# Patient Record
Sex: FEMALE | Race: WHITE | Employment: OTHER | URBAN - NONMETROPOLITAN AREA
[De-identification: names, ages, dates, MRNs, and addresses within clinical notes are randomized per-mention and may not be internally consistent; named-entity substitution may affect disease eponyms.]

---

## 2017-09-06 ENCOUNTER — OFFICE VISIT (OUTPATIENT)
Dept: SURGERY | Age: 67
End: 2017-09-06

## 2017-09-06 VITALS
BODY MASS INDEX: 39.27 KG/M2 | WEIGHT: 230 LBS | HEIGHT: 64 IN | DIASTOLIC BLOOD PRESSURE: 77 MMHG | SYSTOLIC BLOOD PRESSURE: 131 MMHG | HEART RATE: 77 BPM

## 2017-09-06 DIAGNOSIS — Z12.11 SCREENING FOR COLON CANCER: Primary | ICD-10-CM

## 2017-09-06 PROCEDURE — 99203 OFFICE O/P NEW LOW 30 MIN: CPT | Performed by: SURGERY

## 2017-09-06 RX ORDER — MULTIVITAMIN
TABLET ORAL
COMMUNITY

## 2017-09-06 ASSESSMENT — ENCOUNTER SYMPTOMS
EYE ITCHING: 0
CONSTIPATION: 0
EYE REDNESS: 0
STRIDOR: 0
PHOTOPHOBIA: 0
ANAL BLEEDING: 0
CHOKING: 0
APNEA: 0
COLOR CHANGE: 0
SORE THROAT: 0
BACK PAIN: 0
RECTAL PAIN: 0

## 2017-09-08 ENCOUNTER — TELEPHONE (OUTPATIENT)
Dept: SURGERY | Age: 67
End: 2017-09-08

## 2017-09-11 ENCOUNTER — TELEPHONE (OUTPATIENT)
Dept: SURGERY | Age: 67
End: 2017-09-11

## 2017-09-26 PROCEDURE — 45380 COLONOSCOPY AND BIOPSY: CPT | Performed by: SURGERY

## 2017-10-17 ENCOUNTER — TELEPHONE (OUTPATIENT)
Dept: SURGERY | Age: 67
End: 2017-10-17

## 2017-10-17 NOTE — TELEPHONE ENCOUNTER
Spoke to [place] regarding her resched. suzan @ [facility]. Notified of dates, times and location.     Phone assessment  cscope- 11/8/17 @ 800  P/o - 11/22/17 @ 145    Clear liquids and suprep start on 11/7/17 -4pm/4am  Not on blood thinners   sent

## 2017-11-08 ENCOUNTER — HOSPITAL ENCOUNTER (OUTPATIENT)
Dept: SURGERY | Age: 67
Discharge: HOME | End: 2017-11-02
Attending: SURGERY | Admitting: SURGERY

## 2017-11-08 VITALS
HEIGHT: 63 IN | TEMPERATURE: 97 F | WEIGHT: 225 LBS | DIASTOLIC BLOOD PRESSURE: 152 MMHG | OXYGEN SATURATION: 97 % | HEART RATE: 78 BPM | BODY MASS INDEX: 39.87 KG/M2 | SYSTOLIC BLOOD PRESSURE: 172 MMHG | RESPIRATION RATE: 14 BRPM

## 2017-11-08 RX ORDER — SODIUM CHLORIDE, SODIUM LACTATE, POTASSIUM CHLORIDE, CALCIUM CHLORIDE 600; 310; 30; 20 MG/100ML; MG/100ML; MG/100ML; MG/100ML
INJECTION, SOLUTION INTRAVENOUS CONTINUOUS
Status: DISPENSED | OUTPATIENT
Start: 2017-11-08

## 2017-11-08 RX ADMIN — SODIUM CHLORIDE, SODIUM LACTATE, POTASSIUM CHLORIDE, CALCIUM CHLORIDE: 600; 310; 30; 20 INJECTION, SOLUTION INTRAVENOUS at 07:52

## 2017-11-08 ASSESSMENT — PAIN SCALES - GENERAL: PAINLEVEL_OUTOF10: 0

## 2017-11-22 ENCOUNTER — OFFICE VISIT (OUTPATIENT)
Dept: SURGERY | Age: 67
End: 2017-11-22

## 2017-11-22 VITALS
BODY MASS INDEX: 39.88 KG/M2 | SYSTOLIC BLOOD PRESSURE: 106 MMHG | HEIGHT: 63 IN | HEART RATE: 80 BPM | DIASTOLIC BLOOD PRESSURE: 71 MMHG | WEIGHT: 225.09 LBS

## 2017-11-22 DIAGNOSIS — Z12.11 SCREENING FOR COLON CANCER: Primary | ICD-10-CM

## 2017-11-22 DIAGNOSIS — D12.5 ADENOMATOUS POLYP OF SIGMOID COLON: ICD-10-CM

## 2017-11-22 PROCEDURE — 99213 OFFICE O/P EST LOW 20 MIN: CPT | Performed by: SURGERY

## 2017-11-22 ASSESSMENT — ENCOUNTER SYMPTOMS
COLOR CHANGE: 0
RECTAL PAIN: 0
EYE ITCHING: 0
ANAL BLEEDING: 0
CONSTIPATION: 0
APNEA: 0
SORE THROAT: 0
BACK PAIN: 0
CHOKING: 0
EYE REDNESS: 0
PHOTOPHOBIA: 0
STRIDOR: 0

## 2017-12-01 ENCOUNTER — HOSPITAL ENCOUNTER (OUTPATIENT)
Dept: WOMENS IMAGING | Age: 67
Discharge: HOME | End: 2017-12-01
Attending: OBSTETRICS & GYNECOLOGY | Admitting: OBSTETRICS & GYNECOLOGY

## 2017-12-01 DIAGNOSIS — Z12.31 VISIT FOR SCREENING MAMMOGRAM: ICD-10-CM

## 2018-05-24 ENCOUNTER — POST-OP TELEPHONE (OUTPATIENT)
Dept: VASCULAR SURGERY | Age: 68
End: 2018-05-24

## 2018-12-05 ENCOUNTER — HOSPITAL ENCOUNTER (OUTPATIENT)
Dept: WOMENS IMAGING | Age: 68
Discharge: HOME | End: 2018-12-05
Payer: MEDICARE

## 2018-12-05 DIAGNOSIS — Z12.31 SCREENING MAMMOGRAM, ENCOUNTER FOR: ICD-10-CM

## 2018-12-05 PROCEDURE — 77067 SCR MAMMO BI INCL CAD: CPT

## 2019-01-16 ENCOUNTER — HOSPITAL ENCOUNTER (OUTPATIENT)
Dept: PHYSICAL THERAPY | Age: 69
Setting detail: THERAPIES SERIES
Discharge: HOME | End: 2019-01-16
Payer: MEDICARE

## 2019-01-16 PROCEDURE — 97162 PT EVAL MOD COMPLEX 30 MIN: CPT

## 2019-01-16 PROCEDURE — 97110 THERAPEUTIC EXERCISES: CPT

## 2019-01-16 PROCEDURE — 97016 VASOPNEUMATIC DEVICE THERAPY: CPT

## 2019-01-16 ASSESSMENT — PAIN SCALES - GENERAL: PAINLEVEL_OUTOF10: 0

## 2019-01-16 ASSESSMENT — PAIN DESCRIPTION - ONSET: ONSET: PROGRESSIVE

## 2019-01-16 ASSESSMENT — PAIN DESCRIPTION - DIRECTION: RADIATING_TOWARDS: DENIES

## 2019-01-16 ASSESSMENT — PAIN DESCRIPTION - PAIN TYPE: TYPE: CHRONIC PAIN

## 2019-01-16 ASSESSMENT — PAIN - FUNCTIONAL ASSESSMENT: PAIN_FUNCTIONAL_ASSESSMENT: PREVENTS OR INTERFERES SOME ACTIVE ACTIVITIES AND ADLS

## 2019-01-16 ASSESSMENT — PAIN DESCRIPTION - ORIENTATION: ORIENTATION: LEFT;RIGHT;INNER

## 2019-01-16 ASSESSMENT — PAIN DESCRIPTION - FREQUENCY: FREQUENCY: INTERMITTENT

## 2019-01-16 ASSESSMENT — PAIN DESCRIPTION - DESCRIPTORS: DESCRIPTORS: ACHING;BURNING

## 2019-01-16 ASSESSMENT — PAIN DESCRIPTION - LOCATION: LOCATION: KNEE

## 2019-01-16 ASSESSMENT — PAIN DESCRIPTION - PROGRESSION: CLINICAL_PROGRESSION: GRADUALLY WORSENING

## 2019-01-21 ENCOUNTER — HOSPITAL ENCOUNTER (OUTPATIENT)
Dept: PHYSICAL THERAPY | Age: 69
Discharge: HOME | End: 2019-01-21

## 2019-01-21 NOTE — FLOWSHEET NOTE
Physical Therapy  Cancellation/No-show Note  Patient Name:  Alicia Knox  :  1950   Date:  2019  Cancelled visits to date: 1  No-shows to date: 0    For today's appointment patient:  [x]  Cancelled  []  Rescheduled appointment  []  No-show     Reason given by patient:  []  Patient ill  []  Conflicting appointment  []  No transportation    []  Conflict with work  []  No reason given  [x]  Other:     Comments:  Weather    Electronically signed by:  Fernando Franks PTA

## 2019-01-24 ENCOUNTER — HOSPITAL ENCOUNTER (OUTPATIENT)
Dept: PHYSICAL THERAPY | Age: 69
Setting detail: THERAPIES SERIES
Discharge: HOME | End: 2019-01-24
Payer: MEDICARE

## 2019-01-24 PROCEDURE — 97530 THERAPEUTIC ACTIVITIES: CPT

## 2019-01-24 PROCEDURE — 97016 VASOPNEUMATIC DEVICE THERAPY: CPT

## 2019-01-24 PROCEDURE — 97110 THERAPEUTIC EXERCISES: CPT

## 2019-01-30 ENCOUNTER — HOSPITAL ENCOUNTER (OUTPATIENT)
Dept: PHYSICAL THERAPY | Age: 69
Discharge: HOME | End: 2019-01-30

## 2019-02-01 ENCOUNTER — HOSPITAL ENCOUNTER (OUTPATIENT)
Dept: PHYSICAL THERAPY | Age: 69
Setting detail: THERAPIES SERIES
Discharge: HOME | End: 2019-02-01
Payer: MEDICARE

## 2019-02-01 PROCEDURE — 97530 THERAPEUTIC ACTIVITIES: CPT

## 2019-02-01 PROCEDURE — 97110 THERAPEUTIC EXERCISES: CPT

## 2019-02-01 PROCEDURE — 97016 VASOPNEUMATIC DEVICE THERAPY: CPT

## 2019-02-05 ENCOUNTER — HOSPITAL ENCOUNTER (OUTPATIENT)
Age: 69
Setting detail: OBSERVATION
Discharge: HOME | End: 2019-02-06
Attending: EMERGENCY MEDICINE | Admitting: FAMILY MEDICINE
Payer: MEDICARE

## 2019-02-05 DIAGNOSIS — I20.0 UNSTABLE ANGINA (HCC): Primary | ICD-10-CM

## 2019-02-05 PROBLEM — J44.9 COPD, MILD (HCC): Chronic | Status: ACTIVE | Noted: 2019-02-05

## 2019-02-05 LAB
ANION GAP SERPL CALCULATED.3IONS-SCNC: 8 MMOL/L (ref 4–16)
BASOPHILS ABSOLUTE: 0 K/CU MM
BASOPHILS RELATIVE PERCENT: 0.3 % (ref 0–1)
BUN BLDV-MCNC: 10 MG/DL (ref 6–23)
CALCIUM SERPL-MCNC: 9.6 MG/DL (ref 8.3–10.6)
CHLORIDE BLD-SCNC: 101 MMOL/L (ref 99–110)
CO2: 33 MMOL/L (ref 21–32)
CREAT SERPL-MCNC: 0.7 MG/DL (ref 0.6–1.1)
DIFFERENTIAL TYPE: ABNORMAL
EKG ATRIAL RATE: 92 BPM
EKG DIAGNOSIS: NORMAL
EKG P AXIS: 61 DEGREES
EKG P-R INTERVAL: 144 MS
EKG Q-T INTERVAL: 358 MS
EKG QRS DURATION: 86 MS
EKG QTC CALCULATION (BAZETT): 442 MS
EKG R AXIS: 23 DEGREES
EKG T AXIS: 39 DEGREES
EKG VENTRICULAR RATE: 92 BPM
EOSINOPHILS ABSOLUTE: 0.1 K/CU MM
EOSINOPHILS RELATIVE PERCENT: 1 % (ref 0–3)
GFR AFRICAN AMERICAN: >60 ML/MIN/1.73M2
GFR NON-AFRICAN AMERICAN: >60 ML/MIN/1.73M2
GLUCOSE BLD-MCNC: 104 MG/DL (ref 70–99)
HCT VFR BLD CALC: 46.4 % (ref 37–47)
HEMOGLOBIN: 15 GM/DL (ref 12.5–16)
IMMATURE NEUTROPHIL %: 0.3 % (ref 0–0.43)
LYMPHOCYTES ABSOLUTE: 1 K/CU MM
LYMPHOCYTES RELATIVE PERCENT: 9.7 % (ref 24–44)
MCH RBC QN AUTO: 28.8 PG (ref 27–31)
MCHC RBC AUTO-ENTMCNC: 32.3 % (ref 32–36)
MCV RBC AUTO: 89.2 FL (ref 78–100)
MONOCYTES ABSOLUTE: 0.7 K/CU MM
MONOCYTES RELATIVE PERCENT: 7 % (ref 0–4)
PDW BLD-RTO: 14.4 % (ref 11.7–14.9)
PLATELET # BLD: 241 K/CU MM (ref 140–440)
PMV BLD AUTO: 10.8 FL (ref 7.5–11.1)
POTASSIUM SERPL-SCNC: 4.2 MMOL/L (ref 3.5–5.1)
RBC # BLD: 5.2 M/CU MM (ref 4.2–5.4)
SEGMENTED NEUTROPHILS ABSOLUTE COUNT: 8.6 K/CU MM
SEGMENTED NEUTROPHILS RELATIVE PERCENT: 81.7 % (ref 36–66)
SODIUM BLD-SCNC: 142 MMOL/L (ref 135–145)
TOTAL IMMATURE NEUTOROPHIL: 0.03 K/CU MM
TROPONIN T: <0.01 NG/ML
WBC # BLD: 10.5 K/CU MM (ref 4–10.5)

## 2019-02-05 PROCEDURE — 84484 ASSAY OF TROPONIN QUANT: CPT

## 2019-02-05 PROCEDURE — 99285 EMERGENCY DEPT VISIT HI MDM: CPT

## 2019-02-05 PROCEDURE — 2580000003 HC RX 258: Performed by: FAMILY MEDICINE

## 2019-02-05 PROCEDURE — 2580000003 HC RX 258: Performed by: EMERGENCY MEDICINE

## 2019-02-05 PROCEDURE — 6370000000 HC RX 637 (ALT 250 FOR IP): Performed by: FAMILY MEDICINE

## 2019-02-05 PROCEDURE — 93010 ELECTROCARDIOGRAM REPORT: CPT | Performed by: INTERNAL MEDICINE

## 2019-02-05 PROCEDURE — 36415 COLL VENOUS BLD VENIPUNCTURE: CPT

## 2019-02-05 PROCEDURE — 6360000002 HC RX W HCPCS: Performed by: FAMILY MEDICINE

## 2019-02-05 PROCEDURE — 85025 COMPLETE CBC W/AUTO DIFF WBC: CPT

## 2019-02-05 PROCEDURE — 80048 BASIC METABOLIC PNL TOTAL CA: CPT

## 2019-02-05 PROCEDURE — G0378 HOSPITAL OBSERVATION PER HR: HCPCS

## 2019-02-05 PROCEDURE — 96372 THER/PROPH/DIAG INJ SC/IM: CPT

## 2019-02-05 PROCEDURE — 93005 ELECTROCARDIOGRAM TRACING: CPT | Performed by: FAMILY MEDICINE

## 2019-02-05 PROCEDURE — 6370000000 HC RX 637 (ALT 250 FOR IP): Performed by: EMERGENCY MEDICINE

## 2019-02-05 PROCEDURE — 93005 ELECTROCARDIOGRAM TRACING: CPT | Performed by: EMERGENCY MEDICINE

## 2019-02-05 RX ORDER — SODIUM CHLORIDE 0.9 % (FLUSH) 0.9 %
10 SYRINGE (ML) INJECTION 2 TIMES DAILY
Status: DISCONTINUED | OUTPATIENT
Start: 2019-02-05 | End: 2019-02-05

## 2019-02-05 RX ORDER — NITROGLYCERIN 0.4 MG/1
0.4 TABLET SUBLINGUAL EVERY 5 MIN PRN
Status: DISCONTINUED | OUTPATIENT
Start: 2019-02-05 | End: 2019-02-06 | Stop reason: HOSPADM

## 2019-02-05 RX ORDER — MONTELUKAST SODIUM 10 MG/1
10 TABLET ORAL NIGHTLY
Status: DISCONTINUED | OUTPATIENT
Start: 2019-02-05 | End: 2019-02-06 | Stop reason: HOSPADM

## 2019-02-05 RX ORDER — SODIUM CHLORIDE 0.9 % (FLUSH) 0.9 %
10 SYRINGE (ML) INJECTION EVERY 12 HOURS SCHEDULED
Status: DISCONTINUED | OUTPATIENT
Start: 2019-02-05 | End: 2019-02-06 | Stop reason: HOSPADM

## 2019-02-05 RX ORDER — ONDANSETRON 2 MG/ML
4 INJECTION INTRAMUSCULAR; INTRAVENOUS EVERY 6 HOURS PRN
Status: DISCONTINUED | OUTPATIENT
Start: 2019-02-05 | End: 2019-02-06 | Stop reason: HOSPADM

## 2019-02-05 RX ORDER — ASPIRIN 81 MG/1
324 TABLET, CHEWABLE ORAL ONCE
Status: COMPLETED | OUTPATIENT
Start: 2019-02-05 | End: 2019-02-05

## 2019-02-05 RX ORDER — ASPIRIN 81 MG/1
81 TABLET, CHEWABLE ORAL DAILY
Status: DISCONTINUED | OUTPATIENT
Start: 2019-02-06 | End: 2019-02-06 | Stop reason: HOSPADM

## 2019-02-05 RX ORDER — ATORVASTATIN CALCIUM 40 MG/1
40 TABLET, FILM COATED ORAL NIGHTLY
Status: DISCONTINUED | OUTPATIENT
Start: 2019-02-05 | End: 2019-02-06 | Stop reason: HOSPADM

## 2019-02-05 RX ORDER — FAMOTIDINE 20 MG/1
20 TABLET, FILM COATED ORAL 2 TIMES DAILY
Status: DISCONTINUED | OUTPATIENT
Start: 2019-02-05 | End: 2019-02-06 | Stop reason: HOSPADM

## 2019-02-05 RX ORDER — NITROGLYCERIN 0.4 MG/1
0.4 TABLET SUBLINGUAL EVERY 5 MIN PRN
Status: DISCONTINUED | OUTPATIENT
Start: 2019-02-05 | End: 2019-02-05

## 2019-02-05 RX ORDER — SODIUM CHLORIDE 0.9 % (FLUSH) 0.9 %
10 SYRINGE (ML) INJECTION PRN
Status: DISCONTINUED | OUTPATIENT
Start: 2019-02-05 | End: 2019-02-06 | Stop reason: HOSPADM

## 2019-02-05 RX ADMIN — MOMETASONE FUROATE AND FORMOTEROL FUMARATE DIHYDRATE 2 PUFF: 200; 5 AEROSOL RESPIRATORY (INHALATION) at 20:51

## 2019-02-05 RX ADMIN — FAMOTIDINE 20 MG: 20 TABLET, FILM COATED ORAL at 20:51

## 2019-02-05 RX ADMIN — ENOXAPARIN SODIUM 40 MG: 40 INJECTION SUBCUTANEOUS at 17:30

## 2019-02-05 RX ADMIN — NITROGLYCERIN 0.4 MG: 0.4 TABLET SUBLINGUAL at 14:03

## 2019-02-05 RX ADMIN — MONTELUKAST SODIUM 10 MG: 10 TABLET, FILM COATED ORAL at 20:51

## 2019-02-05 RX ADMIN — SODIUM CHLORIDE, PRESERVATIVE FREE 10 ML: 5 INJECTION INTRAVENOUS at 20:51

## 2019-02-05 RX ADMIN — Medication 10 ML: at 16:06

## 2019-02-05 RX ADMIN — ATORVASTATIN CALCIUM 40 MG: 40 TABLET, FILM COATED ORAL at 20:51

## 2019-02-05 RX ADMIN — ASPIRIN 81 MG 324 MG: 81 TABLET ORAL at 14:02

## 2019-02-05 ASSESSMENT — PAIN DESCRIPTION - FREQUENCY: FREQUENCY: CONTINUOUS

## 2019-02-05 ASSESSMENT — ENCOUNTER SYMPTOMS: SHORTNESS OF BREATH: 0

## 2019-02-05 ASSESSMENT — PAIN SCALES - GENERAL
PAINLEVEL_OUTOF10: 3
PAINLEVEL_OUTOF10: 0

## 2019-02-05 ASSESSMENT — PAIN DESCRIPTION - DESCRIPTORS: DESCRIPTORS: ACHING

## 2019-02-05 ASSESSMENT — PAIN DESCRIPTION - ORIENTATION: ORIENTATION: UPPER;MID

## 2019-02-05 ASSESSMENT — PAIN DESCRIPTION - LOCATION: LOCATION: CHEST

## 2019-02-06 VITALS
BODY MASS INDEX: 37.9 KG/M2 | DIASTOLIC BLOOD PRESSURE: 74 MMHG | RESPIRATION RATE: 18 BRPM | WEIGHT: 222 LBS | HEIGHT: 64 IN | OXYGEN SATURATION: 97 % | SYSTOLIC BLOOD PRESSURE: 117 MMHG | HEART RATE: 81 BPM | TEMPERATURE: 97.4 F

## 2019-02-06 PROBLEM — I20.0 UNSTABLE ANGINA (HCC): Status: RESOLVED | Noted: 2019-02-05 | Resolved: 2019-02-06

## 2019-02-06 LAB
ANION GAP SERPL CALCULATED.3IONS-SCNC: 8 MMOL/L (ref 4–16)
BUN BLDV-MCNC: 10 MG/DL (ref 6–23)
CALCIUM SERPL-MCNC: 9.2 MG/DL (ref 8.3–10.6)
CHLORIDE BLD-SCNC: 105 MMOL/L (ref 99–110)
CHOLESTEROL: 216 MG/DL
CO2: 31 MMOL/L (ref 21–32)
CREAT SERPL-MCNC: 0.8 MG/DL (ref 0.6–1.1)
EKG ATRIAL RATE: 71 BPM
EKG ATRIAL RATE: 92 BPM
EKG DIAGNOSIS: NORMAL
EKG DIAGNOSIS: NORMAL
EKG P AXIS: -8 DEGREES
EKG P AXIS: 64 DEGREES
EKG P-R INTERVAL: 140 MS
EKG P-R INTERVAL: 140 MS
EKG Q-T INTERVAL: 372 MS
EKG Q-T INTERVAL: 398 MS
EKG QRS DURATION: 84 MS
EKG QRS DURATION: 88 MS
EKG QTC CALCULATION (BAZETT): 432 MS
EKG QTC CALCULATION (BAZETT): 460 MS
EKG R AXIS: 26 DEGREES
EKG R AXIS: 33 DEGREES
EKG T AXIS: 44 DEGREES
EKG T AXIS: 52 DEGREES
EKG VENTRICULAR RATE: 71 BPM
EKG VENTRICULAR RATE: 92 BPM
GFR AFRICAN AMERICAN: >60 ML/MIN/1.73M2
GFR NON-AFRICAN AMERICAN: >60 ML/MIN/1.73M2
GLUCOSE BLD-MCNC: 115 MG/DL (ref 70–99)
HCT VFR BLD CALC: 42.9 % (ref 37–47)
HDLC SERPL-MCNC: 66 MG/DL
HEMOGLOBIN: 13.7 GM/DL (ref 12.5–16)
LDL CHOLESTEROL DIRECT: 151 MG/DL
MCH RBC QN AUTO: 29 PG (ref 27–31)
MCHC RBC AUTO-ENTMCNC: 31.9 % (ref 32–36)
MCV RBC AUTO: 90.7 FL (ref 78–100)
PDW BLD-RTO: 14.6 % (ref 11.7–14.9)
PLATELET # BLD: 217 K/CU MM (ref 140–440)
PMV BLD AUTO: 11.3 FL (ref 7.5–11.1)
POTASSIUM SERPL-SCNC: 5.1 MMOL/L (ref 3.5–5.1)
RBC # BLD: 4.73 M/CU MM (ref 4.2–5.4)
SODIUM BLD-SCNC: 144 MMOL/L (ref 135–145)
TRIGL SERPL-MCNC: 102 MG/DL
TROPONIN T: <0.01 NG/ML
WBC # BLD: 8 K/CU MM (ref 4–10.5)

## 2019-02-06 PROCEDURE — 6360000002 HC RX W HCPCS: Performed by: FAMILY MEDICINE

## 2019-02-06 PROCEDURE — 93005 ELECTROCARDIOGRAM TRACING: CPT | Performed by: FAMILY MEDICINE

## 2019-02-06 PROCEDURE — 96372 THER/PROPH/DIAG INJ SC/IM: CPT

## 2019-02-06 PROCEDURE — G0378 HOSPITAL OBSERVATION PER HR: HCPCS

## 2019-02-06 PROCEDURE — 2580000003 HC RX 258: Performed by: FAMILY MEDICINE

## 2019-02-06 PROCEDURE — 83721 ASSAY OF BLOOD LIPOPROTEIN: CPT

## 2019-02-06 PROCEDURE — 80048 BASIC METABOLIC PNL TOTAL CA: CPT

## 2019-02-06 PROCEDURE — 84484 ASSAY OF TROPONIN QUANT: CPT

## 2019-02-06 PROCEDURE — 85027 COMPLETE CBC AUTOMATED: CPT

## 2019-02-06 PROCEDURE — 93010 ELECTROCARDIOGRAM REPORT: CPT | Performed by: INTERNAL MEDICINE

## 2019-02-06 PROCEDURE — 80061 LIPID PANEL: CPT

## 2019-02-06 PROCEDURE — 36415 COLL VENOUS BLD VENIPUNCTURE: CPT

## 2019-02-06 PROCEDURE — 6370000000 HC RX 637 (ALT 250 FOR IP): Performed by: FAMILY MEDICINE

## 2019-02-06 RX ORDER — ASPIRIN 81 MG/1
81 TABLET, CHEWABLE ORAL DAILY
Qty: 30 TABLET | Refills: 0 | COMMUNITY
Start: 2019-02-07

## 2019-02-06 RX ORDER — NITROGLYCERIN 0.4 MG/1
TABLET SUBLINGUAL
Qty: 25 TABLET | Refills: 0 | Status: SHIPPED | OUTPATIENT
Start: 2019-02-06

## 2019-02-06 RX ORDER — ATORVASTATIN CALCIUM 20 MG/1
20 TABLET, FILM COATED ORAL NIGHTLY
Qty: 30 TABLET | Refills: 0 | Status: SHIPPED | OUTPATIENT
Start: 2019-02-06

## 2019-02-06 RX ADMIN — ASPIRIN 81 MG 81 MG: 81 TABLET ORAL at 09:01

## 2019-02-06 RX ADMIN — ENOXAPARIN SODIUM 40 MG: 40 INJECTION SUBCUTANEOUS at 09:01

## 2019-02-06 RX ADMIN — MOMETASONE FUROATE AND FORMOTEROL FUMARATE DIHYDRATE 2 PUFF: 200; 5 AEROSOL RESPIRATORY (INHALATION) at 09:00

## 2019-02-06 RX ADMIN — FAMOTIDINE 20 MG: 20 TABLET, FILM COATED ORAL at 09:01

## 2019-02-06 RX ADMIN — SODIUM CHLORIDE, PRESERVATIVE FREE 10 ML: 5 INJECTION INTRAVENOUS at 09:01

## 2019-02-06 ASSESSMENT — PAIN SCALES - GENERAL: PAINLEVEL_OUTOF10: 0

## 2019-02-07 ENCOUNTER — HOSPITAL ENCOUNTER (OUTPATIENT)
Dept: CARDIAC REHAB | Age: 69
Discharge: HOME | End: 2019-02-07
Payer: MEDICARE

## 2019-02-07 ENCOUNTER — HOSPITAL ENCOUNTER (OUTPATIENT)
Dept: NUCLEAR MEDICINE | Age: 69
Discharge: HOME | End: 2019-02-07
Payer: MEDICARE

## 2019-02-07 LAB
LV EF: 70 %
LVEF MODALITY: NORMAL

## 2019-02-07 PROCEDURE — 6360000002 HC RX W HCPCS

## 2019-02-07 PROCEDURE — 78452 HT MUSCLE IMAGE SPECT MULT: CPT

## 2019-02-07 PROCEDURE — 3430000000 HC RX DIAGNOSTIC RADIOPHARMACEUTICAL: Performed by: FAMILY MEDICINE

## 2019-02-07 PROCEDURE — A9500 TC99M SESTAMIBI: HCPCS | Performed by: FAMILY MEDICINE

## 2019-02-07 PROCEDURE — 93017 CV STRESS TEST TRACING ONLY: CPT

## 2019-02-07 RX ADMIN — Medication 30 MILLICURIE: at 14:12

## 2019-02-07 RX ADMIN — Medication 10 MILLICURIE: at 14:12

## 2019-02-08 ENCOUNTER — HOSPITAL ENCOUNTER (OUTPATIENT)
Dept: PHYSICAL THERAPY | Age: 69
Setting detail: THERAPIES SERIES
Discharge: HOME | End: 2019-02-08
Payer: MEDICARE

## 2019-02-11 ENCOUNTER — OFFICE VISIT (OUTPATIENT)
Dept: CARDIOLOGY CLINIC | Age: 69
End: 2019-02-11
Payer: MEDICARE

## 2019-02-11 VITALS
HEIGHT: 63 IN | DIASTOLIC BLOOD PRESSURE: 76 MMHG | HEART RATE: 98 BPM | SYSTOLIC BLOOD PRESSURE: 122 MMHG | WEIGHT: 235 LBS | BODY MASS INDEX: 41.64 KG/M2

## 2019-02-11 DIAGNOSIS — R06.02 SHORTNESS OF BREATH: Primary | ICD-10-CM

## 2019-02-11 PROCEDURE — 99203 OFFICE O/P NEW LOW 30 MIN: CPT | Performed by: INTERNAL MEDICINE

## 2019-02-11 PROCEDURE — 1090F PRES/ABSN URINE INCON ASSESS: CPT | Performed by: INTERNAL MEDICINE

## 2019-02-11 PROCEDURE — G8427 DOCREV CUR MEDS BY ELIG CLIN: HCPCS | Performed by: INTERNAL MEDICINE

## 2019-02-11 PROCEDURE — G8417 CALC BMI ABV UP PARAM F/U: HCPCS | Performed by: INTERNAL MEDICINE

## 2019-02-11 PROCEDURE — 1101F PT FALLS ASSESS-DOCD LE1/YR: CPT | Performed by: INTERNAL MEDICINE

## 2019-02-11 PROCEDURE — G8484 FLU IMMUNIZE NO ADMIN: HCPCS | Performed by: INTERNAL MEDICINE

## 2019-02-11 PROCEDURE — 3017F COLORECTAL CA SCREEN DOC REV: CPT | Performed by: INTERNAL MEDICINE

## 2019-02-13 ENCOUNTER — TELEPHONE (OUTPATIENT)
Dept: CARDIOLOGY CLINIC | Age: 69
End: 2019-02-13

## 2019-02-27 ENCOUNTER — PROCEDURE VISIT (OUTPATIENT)
Dept: CARDIOLOGY CLINIC | Age: 69
End: 2019-02-27
Payer: MEDICARE

## 2019-02-27 DIAGNOSIS — R06.02 SHORTNESS OF BREATH: Primary | ICD-10-CM

## 2019-02-27 LAB
LV EF: 58 %
LVEF MODALITY: NORMAL

## 2019-02-27 PROCEDURE — 93306 TTE W/DOPPLER COMPLETE: CPT | Performed by: INTERNAL MEDICINE

## 2019-02-28 ENCOUNTER — TELEPHONE (OUTPATIENT)
Dept: CARDIOLOGY CLINIC | Age: 69
End: 2019-02-28

## 2019-03-27 ENCOUNTER — HOSPITAL ENCOUNTER (OUTPATIENT)
Dept: PHYSICAL THERAPY | Age: 69
Setting detail: THERAPIES SERIES
Discharge: HOME | End: 2019-03-27
Payer: MEDICARE

## 2019-03-27 PROCEDURE — 97530 THERAPEUTIC ACTIVITIES: CPT

## 2019-03-27 PROCEDURE — 97110 THERAPEUTIC EXERCISES: CPT

## 2019-03-27 PROCEDURE — 97016 VASOPNEUMATIC DEVICE THERAPY: CPT

## 2019-03-27 PROCEDURE — 97140 MANUAL THERAPY 1/> REGIONS: CPT

## 2019-04-01 ENCOUNTER — HOSPITAL ENCOUNTER (OUTPATIENT)
Dept: PHYSICAL THERAPY | Age: 69
Setting detail: THERAPIES SERIES
Discharge: HOME | End: 2019-04-01
Payer: MEDICARE

## 2019-04-01 PROCEDURE — 97110 THERAPEUTIC EXERCISES: CPT

## 2019-04-01 PROCEDURE — 97016 VASOPNEUMATIC DEVICE THERAPY: CPT

## 2019-04-01 NOTE — FLOWSHEET NOTE
strength/ROM, manual and modalities to maximize function. Pt prior to onset of current condition had min pain with able to complete full ADLs and work activities. Patient agrees with established plan of care and assisted in the development of their short term and long term goals. Patient had no adverse reaction with initial treatment and there are no barriers to learning. Demonstrates no mental or cognitive disorder.           Subjective:  Patient states that her knee pain is a 3/10 today. Had a really bad weekend with the pain. Had to work 6 extra hours yesterday and was on her feet the entire time which caused increased pain. Rates this pain 7/10. Has been doing 3 out of 5 of her exercises at home.          Any changes in Ambulatory Summary Sheet? No         Objective: Increased antalgic gait pattern          Exercises:  Exercise/Equipment 2/1/19 3/27/19 4/1/19             Warm-up          Nu step   Lev 5, 5 min   Lv5   5'  Lev 5, 5 min            TABLE          TE            Quad set with large towel  2x10x3\"  2x10x3\" 2x10x3\"     SLR  2x15      LAQ 2x15     Sitting knee ext stretch with self applied pressure 5# 3x1 min  7# 3x1'   For L knee 7# 3x1 min for L knee   Heelslides  RSB 10x2 RSB 2x10                   NR                                          STANDING          TE            DF calf stretch   Slantboard  15\"x3   Wedge  30\"x2   4 way hip 15x B  15x B     Heel Raises  20x   20x   TA            Marches   2x10   20x ea     Ascending/descending stairs   5x       Sit to stand   2x10 from 20.5 inch table  2x10 from 20.5 inch table 2x10 from 20.5 inch table   NR                                            Other Therapeutic Activities/Education:--        Home Exercise Program:  HO issued, reviewed and discussed with patient.  Pt agreed to comply.       Manual Treatments:  Light overpressure into TKE with exercises      Modalities: Gameready to L knee in supine with support, low pressure, 34 deg x10' for pain management. No adverse effects.       Communication with other providers:  POC sent 1/16/19     Assessment: Patient continued to rate her left knee pain 3/10 after treatment. Has difficulty performing sit to stand transfers without use of B UE, but is able to do so. Plan for Next Session:  open/closed chain knee ext/flex to end ranges, L quad strengthening with ankle weights, light standing activities promoting standing onto L LE, manual stretching to knee, patellar mobs.  Gameready.      Time In / Time Out:   1205/7641     Timed Code/Total Treatment Minutes:  40' ( 10' Vaso, 34' TE)        Next Progress Note due:  Eval 1/16/19   PN completed 3/27/19         Plan of Care Interventions:  [x] Therapeutic Exercise             [x] Modalities:  [] Therapeutic Activity                           [] Ultrasound                  [] Estem  [] Gait Training                                      [] Cervical Traction        [] Lumbar Traction  [] Neuromuscular Re-education                        [] Cold/hotpack  [] Iontophoresis   [x] Instruction in HEP                                          [x] Vasopneumatic          [] Dry Needling    [] Manual Therapy                                                      [] Aquatic Therapy                                  Electronically signed by:  Charna Mcburney, PTA           4/1/2019 1:52 PM

## 2019-04-03 ENCOUNTER — HOSPITAL ENCOUNTER (OUTPATIENT)
Dept: PHYSICAL THERAPY | Age: 69
Discharge: HOME | End: 2019-04-03

## 2019-04-03 NOTE — FLOWSHEET NOTE
Physical Therapy  Cancellation/No-show Note  Patient Name:  Joann Burt  :  1950   Date:  4/3/2019  Cancelled visits to date: 4  No-shows to date: 0    For today's appointment patient:  []  Cancelled  []  Rescheduled appointment  [x]  No-show     Reason given by patient:  []  Patient ill  [x]  Conflicting appointment  []  No transportation    []  Conflict with work  []  No reason given  []  Other:     Comments:      Electronically signed by:  Mohamud Moreno PTA

## 2019-04-04 ENCOUNTER — HOSPITAL ENCOUNTER (OUTPATIENT)
Dept: PHYSICAL THERAPY | Age: 69
Setting detail: THERAPIES SERIES
Discharge: HOME | End: 2019-04-04
Payer: MEDICARE

## 2019-04-04 PROCEDURE — 97110 THERAPEUTIC EXERCISES: CPT

## 2019-04-04 PROCEDURE — 97140 MANUAL THERAPY 1/> REGIONS: CPT

## 2019-04-04 NOTE — FLOWSHEET NOTE
Outpatient Physical Therapy           [place]           [x] Phone: [phone]   Fax: [phone]  [place]           [] Phone: [phone]   Fax: [phone]    Physical Therapy Daily Treatment Note  Date:  2019    Patient Name:  Jelly Brown    :  1950  MRN: [id number]  Restrictions/Precautions:  --  Diagnosis: L>R Knee OA pain/effusion       Date of Injury/Surgery: --  Treatment Diagnosis: L>R knee pain, stiffness, weakness, gait dysfunction       Insurance/Certification information:   Medicare  Referring Physician: Dr. Quintin Jo    Next Doctor Visit:    Plan of care signed (Y/N): Yes   Outcome Measure:   Visit# / total visits:   per POC   Recount 3/27/19  Pain level:    3-4 /10 L    1/10 R       Goals:        Short term goals  Time Frame for Short term goals: Defer to [address] term goals  Time Frame for Long term goals : 4 weeks 19  Long term goal 1: Pt will demo I with HEP/symptom managmenet. Progressing   Long term goal 2: Pt will demo <12 deg knee ext to >115 deg knee flex to ease transfers. Partially MET   Long term goal 3: Pt will demo >125ft improvement per 6MWT to demo improved gait tolerance. Partially MET   Long term goal 4: Pt will demo 4+/5 L LE strength to ease transfers. Partially MET   Long term goal 5: Pt will demo >8 reps with 30 sec sit to stand to demo improve LE strength. Partially MET   Long term goal 6: Pt will demo <40% disability per LEFS. Partially MET           Summary of Evaluation:   Pt is 76year old female with chronic gradual onset of L>R knee pain. Pt now has difficulties completing all weightbearing activities including standing and walking secondary to pain. Pt demo deficits this date that include L knee pain, patellar mobility, L knee A/PROM, balance, gait mechanics and tolerance with weightbearing activities . Testing this date indicate signs and symptoms of >mod knee OA.  Pt will benefit with PT services with progression of strength/ROM, manual and modalities to maximize function. Pt prior to onset of current condition had min pain with able to complete full ADLs and work activities. Patient agrees with established plan of care and assisted in the development of their short term and long term goals. Patient had no adverse reaction with initial treatment and there are no barriers to learning. Demonstrates no mental or cognitive disorder.           Subjective:  Left knee pain rated 3-4/10 and right knee pain 1/10 today due to decreased knee ROM.       Any changes in Ambulatory Summary Sheet? No         Objective: Increased antalgic gait pattern          Exercises:  Exercise/Equipment 3/27/19 4/1/19 4/4/19            Warm-up         Nu step   Lv5   5'  Lev 5, 5 min Lev 5, 5 min            TABLE         TE           Quad set with large towel  2x10x3\" 2x10x3\" 2x10x3\"     SLR       LAQ      Sitting knee ext stretch with self applied pressure 7# 3x1'   For L knee 7# 3x1 min for L knee 7# 3 min to left knee   Heelslides RSB 10x2 RSB 2x10 RSB 2x10                  NR                                      STANDING         TE           DF calf stretch    Wedge  30\"x2 Wedge  30\"x2   4 way hip  15x B 20x B     Heel Raises   20x 20x   TA           Marches    20x ea  On foam 20x ea     Ascending/descending stairs         Sit to stand   2x10 from 20.5 inch table 2x10 from 20.5 inch table 2x10 from 20.5 inch table   NR                                        Other Therapeutic Activities/Education:--        Home Exercise Program:  HO issued, reviewed and discussed with patient. Pt agreed to comply.       Manual Treatments:  Light overpressure into TKE with exercises      Modalities: Gameready to L knee in supine with support, low pressure, 34 deg x10' for pain management. No adverse effects.       Communication with other providers:  POC sent 1/16/19     Assessment: Patient rated her left knee pain 2/10 after treatment.   Continues to demonstrate increased difficulty with sit to stands from lower surfaces. Plan for Next Session:  open/closed chain knee ext/flex to end ranges, L quad strengthening with ankle weights, light standing activities promoting standing onto L LE, manual stretching to knee, patellar mobs.  Gameready.      Time In / Time Out:   1115/1155     Timed Code/Total Treatment Minutes:  36' ( 10' Vaso, 30' TE)        Next Progress Note due:  Eval 1/16/19   PN completed 3/27/19         Plan of Care Interventions:  [x] Therapeutic Exercise             [x] Modalities:  [] Therapeutic Activity                           [] Ultrasound                  [] Estem  [] Gait Training                                      [] Cervical Traction        [] Lumbar Traction  [] Neuromuscular Re-education                        [] Cold/hotpack  [] Iontophoresis   [x] Instruction in HEP                                          [x] Vasopneumatic          [] Dry Needling    [] Manual Therapy                                                      [] Aquatic Therapy                                  Electronically signed by:  Danny Hendrix PTA           4/4/2019 11:16 AM

## 2019-04-08 ENCOUNTER — HOSPITAL ENCOUNTER (OUTPATIENT)
Dept: PHYSICAL THERAPY | Age: 69
Setting detail: THERAPIES SERIES
Discharge: HOME | End: 2019-04-08
Payer: MEDICARE

## 2019-04-08 PROCEDURE — 97110 THERAPEUTIC EXERCISES: CPT

## 2019-04-08 PROCEDURE — 97016 VASOPNEUMATIC DEVICE THERAPY: CPT

## 2019-04-08 NOTE — FLOWSHEET NOTE
strength/ROM, manual and modalities to maximize function. Pt prior to onset of current condition had min pain with able to complete full ADLs and work activities. Patient agrees with established plan of care and assisted in the development of their short term and long term goals. Patient had no adverse reaction with initial treatment and there are no barriers to learning. Demonstrates no mental or cognitive disorder.           Subjective:  Patient rates her knee pain 2-3/10 today. Continues to have difficulty standing from low surfaces.          Any changes in Ambulatory Summary Sheet? No         Objective: Patient continues to lack full knee extension.          Exercises:  Exercise/Equipment 4/1/19 4/4/19 4/8/19           Warm-up        Nu step  Lev 5, 5 min Lev 5, 5 min  Lev 5, 5 min          TABLE        TE          Quad set with large towel 2x10x3\" 2x10x3\" 2x10x3\"     SLR      LAQ      Sitting knee ext stretch with self applied pressure 7# 3x1 min for L knee 7# 3 min to left knee 7# 3 min to left knee   Heelslides RSB 2x10 RSB 2x10 RSB 20x                 NR                                  STANDING        TE          DF calf stretch  Wedge  30\"x2 Wedge  30\"x2 Wedge 30\"x2   4 way hip 15x B 20x B 20x B     Heel Raises 20x 20x 20x   TA          Marches  20x ea  On foam 20x ea  On foam 20x ea     Ascending/descending stairs        Sit to stand  2x10 from 20.5 inch table 2x10 from 20.5 inch table 2x10 from 20.5 in table   NR                                    Other Therapeutic Activities/Education:--        Home Exercise Program:  HO issued, reviewed and discussed with patient. Pt agreed to comply.       Manual Treatments:  Light overpressure into TKE with exercises      Modalities: Gameready to L knee in supine with support, low pressure, 34 deg x10' for pain management. No adverse effects.       Communication with other providers:  POC sent 1/16/19     Assessment: Patient rated her pain 1-2/10 after treatment. Always feels better after       Plan for Next Session:  open/closed chain knee ext/flex to end ranges, L quad strengthening with ankle weights, light standing activities promoting standing onto L LE, manual stretching to knee, patellar mobs.  Gameready.      Time In / Time Out:   9326-9555     Timed Code/Total Treatment Minutes:  45' ( 10' Vaso, 28' TE)        Next Progress Note due:  Eval 1/16/19   PN completed 3/27/19         Plan of Care Interventions:  [x] Therapeutic Exercise             [x] Modalities:  [] Therapeutic Activity                           [] Ultrasound                  [] Estem  [] Gait Training                                      [] Cervical Traction        [] Lumbar Traction  [] Neuromuscular Re-education                        [] Cold/hotpack  [] Iontophoresis   [x] Instruction in HEP                                          [x] Vasopneumatic          [] Dry Needling    [] Manual Therapy                                                      [] Aquatic Therapy                                  Electronically signed by:  Patrick Mcgovern PTA           4/8/2019 1:41 PM

## 2019-04-18 ENCOUNTER — HOSPITAL ENCOUNTER (OUTPATIENT)
Dept: PHYSICAL THERAPY | Age: 69
Setting detail: THERAPIES SERIES
Discharge: HOME | End: 2019-04-18
Payer: MEDICARE

## 2019-04-18 PROCEDURE — 97110 THERAPEUTIC EXERCISES: CPT

## 2019-04-18 PROCEDURE — 97016 VASOPNEUMATIC DEVICE THERAPY: CPT

## 2019-04-18 PROCEDURE — 97530 THERAPEUTIC ACTIVITIES: CPT

## 2019-04-18 NOTE — DISCHARGE SUMMARY
Outpatient Physical Therapy           [place]           [] Phone: [phone]   Fax: [phone]  [place]           [] Phone: [phone]   Fax: [phone]      To:  Dr. Felicia Hines     From: Nixon Samuels, PT, DPT, OCS     Patient: Michelle Vang      : 1950  Diagnosis:    L/R Knee OA pain/effusion  Date: 2019  Treatment Diagnosis:   L>R knee pain, stiffness, weakness, gait dysfunction      []  Progress Note                [x]  Discharge Note    Evaluation Date: 19    Total Visits to date: 8   Cancels/No-shows to date:  4    Subjective:   Patient rates her knee pain 4/10 today. Continues to have difficulty standing from low surfaces. Plans to have knee replacement later this year with L knee. Plan to have injection in the near future. Plan of Care/Treatment to date:  [x] Therapeutic Exercise    [x] Modalities:  [] Therapeutic Activity     [] Ultrasound  [] Electrical Stimulation  [] Gait Training      [] Cervical Traction   [] Lumbar Traction  [] Neuromuscular Re-education  [] Cold/hotpack [] Iontophoresis  [x] Instruction in HEP      Other:  [] Manual Therapy       [x]  Vasopneumatic  [] Aquatic Therapy       []                    ? Objective/Significant Findings At Last Visit/Comments:    Tender to medial and lateral joint line on L knee, medial joint line on R   Patient continues to lack full knee extension with ambulation.       AROM RLE (degrees)  RLE General AROM: 0-116 deg knee ext/flex with tightness at end ranges   AROM LLE (degrees)  LLE General AROM: -12 from full ext -110 deg knee ext/flex  PROM LLE (degrees)  LLE General PROM: -10 to 111 deg with tightness and min pain with applied overpressure.    Joint Mobility  ROM LLE: Mod superior/inferior patellar mobility with crepitus with glides.    Strength RLE  Strength RLE: WFL  Comment: Global 4+/5 R LE with very min pain with knee ext   Strength LLE  Comment: Min pain with knee ext testing, denies pain with all Continue per initial plan of Care     [x] Patient now discharged     [] Additional visits requested, Please re-certify for additional visits: If we are requesting more visits, we fully anticipate the patient's condition is expected to improve within the treatment timeframe we are requesting. Electronically signed by:    Sukhwinder Reyes, PT, DPT, OCS 4/18/2019, 10:42 AM      4/18/2019 10:42 AM     If you have any questions or concerns, please don't hesitate to call.   Thank you for your referral.    Physician Signature:______________________ Date:______ Time: ________  By signing above, therapists plan is approved by physician

## 2019-04-18 NOTE — FLOWSHEET NOTE
Outpatient Physical Therapy           [place]           [x] Phone: [phone]   Fax: [phone]  [place]           [] Phone: [phone]   Fax: [phone]    Physical Therapy Daily Treatment Note  Date:  2019    Patient Name:  Omari Medrano    :  1950  MRN: [id number]  Restrictions/Precautions:  --  Diagnosis: L>R Knee OA pain/effusion       Date of Injury/Surgery: --  Treatment Diagnosis: L>R knee pain, stiffness, weakness, gait dysfunction       Insurance/Certification information:   Medicare  Referring Physician: Dr. Shelby Bragg    Next Doctor Visit:    Plan of care signed (Y/N): Yes   Outcome Measure:   Visit# / total visits:   per POC   Recount 3/27/19  Pain level:     2/10 L    410 R       Goals:        Short term goals  Time Frame for Short term goals: Defer to [address] term goals  Time Frame for Long term goals : 4 weeks 19  Long term goal 1: Pt will demo I with HEP/symptom managmenet. MET  Long term goal 2: Pt will demo <12 deg knee ext to >115 deg knee flex to ease transfers. Partially MET   Long term goal 3: Pt will demo >125ft improvement per 6MWT to demo improved gait tolerance. Partially MET   Long term goal 4: Pt will demo 4+/5 L LE strength to ease transfers. Mostly MET   Long term goal 5: Pt will demo >8 reps with 30 sec sit to stand to demo improve LE strength. MET   Long term goal 6: Pt will demo <40% disability per LEFS. Mostly MET           Summary of Evaluation:   Pt is 76year old female with chronic gradual onset of L>R knee pain. Pt now has difficulties completing all weightbearing activities including standing and walking secondary to pain. Pt demo deficits this date that include L knee pain, patellar mobility, L knee A/PROM, balance, gait mechanics and tolerance with weightbearing activities . Testing this date indicate signs and symptoms of >mod knee OA.  Pt will benefit with PT services with progression of strength/ROM, manual and modalities to maximize function. Pt prior to onset of current condition had min pain with able to complete full ADLs and work activities. Patient agrees with established plan of care and assisted in the development of their short term and long term goals. Patient had no adverse reaction with initial treatment and there are no barriers to learning. Demonstrates no mental or cognitive disorder.           Subjective:  Patient rates her knee pain 4/10 today. Continues to have difficulty standing from low surfaces. Plans to have knee replacement later this year with L knee. Plan to have injection in the near future.         Any changes in Ambulatory Summary Sheet? No         Objective:   Tender to medial and lateral joint line on L knee, medial joint line on R   Patient continues to lack full knee extension with ambulation. AROM RLE (degrees)  RLE General AROM: 0-116 deg knee ext/flex with tightness at end ranges   AROM LLE (degrees)  LLE General AROM: -12 from full ext -110 deg knee ext/flex  PROM LLE (degrees)  LLE General PROM: -10 to 111 deg with tightness and min pain with applied overpressure. Joint Mobility  ROM LLE: Mod superior/inferior patellar mobility with crepitus with glides.      Strength RLE  Strength RLE: WFL  Comment: Global 4+/5 R LE with very min pain with knee ext   Strength LLE  Comment: Min pain with knee ext testing, denies pain with all other testing. L Hip Flexion: 4/5  L Hip Extension: 4/5  L Hip ABduction: 4-/5  L Hip ADduction: 4-/5  L Hip Internal Rotation: 4/5  L Hip External Rotation: 4+/5  L Knee Flexion: 4/5  L Knee Extension: 4/5    R knee flex/ext: 4+/5     Strength Other  Other: 30 sec sit to stand: 8 reps with UE assistance.      6MWT: 752 ft with 1 sitting rest breaks to continue    Balance  Single Leg Stance R Le  Single Leg Stance L Le  Comments: Unable secondary to poor balance (lack confidence) and min increase in L knee pain     Able to complete 1/4 squat without aggravation. LEFS this date: 44/80    45% disability        Exercises:  Exercise/Equipment 4/1/19 4/4/19 4/8/19 4/18/19            Warm-up         Nu step  Lev 5, 5 min Lev 5, 5 min  Lev 5, 5 min Lv 5  5'            TABLE         TE           Quad set with large towel 2x10x3\" 2x10x3\" 2x10x3\"      SLR       LAQ    15x2   Sitting knee ext stretch with self applied pressure 7# 3x1 min for L knee 7# 3 min to left knee 7# 3 min to left knee Knee flexion stretch 5-10\" 5x2   Heelslides RSB 2x10 RSB 2x10 RSB 20x    Supine hip flexor stretch     15\" x3 B            NR                                      STANDING         TE           DF calf stretch  Wedge  30\"x2 Wedge  30\"x2 Wedge 30\"x2    4 way hip 15x B 20x B 20x B      Heel Raises 20x 20x 20x    TA           Marches  20x ea  On foam 20x ea  On foam 20x ea      Ascending/descending stairs         Sit to stand  2x10 from 20.5 inch table 2x10 from 20.5 inch table 2x10 from 20.5 in table 10x2 with min UE assistance    NR                                        Other Therapeutic Activities/Education:--        Home Exercise Program:  New HO issued, reviewed and discussed with patient 4/18/19. Pt agreed to comply.       Manual Treatments:  --     Modalities: Gameready to L knee in supine with support, low pressure, 34 deg x10' for pain management. No adverse effects.       Communication with other providers:  D/c sent 4/18/19     Assessment: Pt has completed 8 visits since start of therapy on 1/16/19. Pt has increased ease completing light ambulating and ADLs activities. Pt demo improvements that include min improvement in strength/ROM, weightbearing tolerance and SLS. Remains with lacking terminal knee extension on L, pain with weightbearing and weakness compared to R knee. PT appears at max potential with therapy and benefit to continue with HEP to address deficits. Pt demo independence and will be discharged at this time.  Pt agrees to this plan.           Time In / Time Out:   1045/1140     Timed Code/Total Treatment Minutes:  45/55' ( 10' Vaso, 15' TA, 27 TE)        Next Progress Note due:  Eval 1/16/19   D/c completed 4/18/19         Plan of Care Interventions:  [x] Therapeutic Exercise             [x] Modalities:  [] Therapeutic Activity                           [] Ultrasound                  [] Estem  [] Gait Training                                      [] Cervical Traction        [] Lumbar Traction  [] Neuromuscular Re-education                        [] Cold/hotpack  [] Iontophoresis   [x] Instruction in HEP                                          [x] Vasopneumatic          [] Dry Needling    [] Manual Therapy                                                      [] Aquatic Therapy                                  Electronically signed by:    Alessandra Camilo, PT, DPT, OCS        4/18/2019 11:33 AM        4/18/2019 10:42 AM

## 2019-12-09 ENCOUNTER — HOSPITAL ENCOUNTER (OUTPATIENT)
Dept: WOMENS IMAGING | Age: 69
Discharge: HOME | End: 2019-12-09
Payer: MEDICARE

## 2019-12-09 DIAGNOSIS — Z12.31 BREAST CANCER SCREENING BY MAMMOGRAM: ICD-10-CM

## 2019-12-09 PROCEDURE — 77067 SCR MAMMO BI INCL CAD: CPT

## 2020-02-05 ENCOUNTER — HOSPITAL ENCOUNTER (OUTPATIENT)
Dept: MAMMOGRAPHY | Age: 70
Discharge: HOME | End: 2020-02-05
Payer: MEDICARE

## 2020-02-05 PROCEDURE — 77080 DXA BONE DENSITY AXIAL: CPT

## 2020-12-21 ENCOUNTER — HOSPITAL ENCOUNTER (OUTPATIENT)
Dept: WOMENS IMAGING | Age: 70
Discharge: HOME | End: 2020-12-21
Payer: MEDICARE

## 2020-12-21 PROCEDURE — 77063 BREAST TOMOSYNTHESIS BI: CPT

## 2022-02-15 ENCOUNTER — HOSPITAL ENCOUNTER (OUTPATIENT)
Dept: WOMENS IMAGING | Age: 72
Discharge: HOME | End: 2022-02-15
Payer: MEDICARE

## 2022-02-15 DIAGNOSIS — Z12.31 ENCOUNTER FOR SCREENING MAMMOGRAM FOR BREAST CANCER: ICD-10-CM

## 2022-02-15 PROCEDURE — 77063 BREAST TOMOSYNTHESIS BI: CPT

## 2022-11-11 ENCOUNTER — HOSPITAL ENCOUNTER (OUTPATIENT)
Dept: PHYSICAL THERAPY | Age: 72
Setting detail: THERAPIES SERIES
Discharge: HOME | End: 2022-11-11
Payer: MEDICARE

## 2022-11-11 PROCEDURE — 97162 PT EVAL MOD COMPLEX 30 MIN: CPT

## 2022-11-11 PROCEDURE — 97110 THERAPEUTIC EXERCISES: CPT

## 2022-11-11 ASSESSMENT — PAIN SCALES - GENERAL: PAINLEVEL_OUTOF10: 0

## 2022-11-11 NOTE — PROGRESS NOTES
Physical Therapy: Initial Evaluation    Patient: Franchesca Abreu (89 y.o. female)   Examination Date:   Plan of Care Certification Period: 2022 to        :  1950 ;    Confirmed: Yes MRN: [id number]  CSN: [id number]   Insurance: Payor: Shilpa Byrner / Plan: [facility] [employer] / Product Type: *No Product type* /   Insurance ID: [id number] - (Medicare Managed) Secondary Insurance (if applicable):    Referring Physician: MD Cruzito Faustin Wacissa   PCP: Figueroa Padilla MD Visits to Date/Visits Approved:   /      No Show/Cancelled Appts:   /       Medical Diagnosis: Unilateral primary osteoarthritis, left knee [M17.12]  Presence of left artificial knee joint [Z96.652] L TKA 10/18/22  Treatment Diagnosis: L knee stiffness     PERTINENT MEDICAL HISTORY   Patient Assessed for Rehabilitation Services: Yes       Medical History: Chart Reviewed: Yes   Past Medical History:   Diagnosis Date    Asthma     Cataract extraction status, left 2018    Chest pain     H/O cardiovascular stress test 2019    EF70%    H/O echocardiogram 2019    EF 55-60 %. Normal study. Hyperlipidemia     MVP (mitral valve prolapse)      Surgical History:   Past Surgical History:   Procedure Laterality Date    ELBOW SURGERY      pinched nerve    KNEE ARTHROSCOPY  2011       Medications:   Current Outpatient Medications:     aspirin 81 MG chewable tablet, Take 1 tablet by mouth daily, Disp: 30 tablet, Rfl: 0    nitroGLYCERIN (NITROSTAT) 0.4 MG SL tablet, up to max of 3 total doses. If no relief after 1 dose, call 911., Disp: 25 tablet, Rfl: 0    atorvastatin (LIPITOR) 20 MG tablet, Take 1 tablet by mouth nightly, Disp: 30 tablet, Rfl: 0    Multiple Vitamin (MULTI VITAMIN DAILY) TABS, Take by mouth, Disp: , Rfl:     Naproxen Sodium (ALEVE PO), Take by mouth, Disp: , Rfl:     fluticasone-salmeterol (ADVAIR DISKUS) 250-50 MCG/DOSE AEPB, Inhale 1 puff into the lungs every 12 hours.   , Disp: , Rfl: montelukast (SINGULAIR) 10 MG tablet, Take 10 mg by mouth nightly.  , Disp: , Rfl:   No current facility-administered medications for this encounter.     Facility-Administered Medications Ordered in Other Encounters:     lactated ringers infusion, , IntraVENous, Continuous, Isabel Claudio MD, Last Rate: 100 mL/hr at 11/08/17 0752, New Bag at 11/08/17 8877  Allergies: Sulfa antibiotics and Vicodin [hydrocodone-acetaminophen]      SUBJECTIVE EXAMINATION      ,           Subjective History:    Subjective: s/p L TKA 10/18/22- [address]- overnight stay; [facility] PT ended 11/8/22; not sleeping well; has HEP  Additional Pertinent Hx (if applicable):            Learning/Language:       Pain Screening   Pain Screening  Patient Currently in Pain: Yes  Pain Assessment: 0-10  Pain Level: 0    Functional Status         Social History:  Social History  Lives With: Alone  Type of Home: House  Home Layout: One level, Laundry in basement  Home Access: Stairs to enter with rails  Entrance Stairs - Rails: Left  Entrance Stairs - Number of Steps: 3  Bathroom Shower/Tub: Tub/Shower unit  Bathroom Equipment: Shower chair    Occupation/Interests:  Occupation: Retired    Prior Level of Function:    Independent        Current Level of Function:         ADL Assistance: Independent  Homemaking Assistance: Independent  Ambulation Assistance: Independent  Transfer Assistance: Independent  Active : Yes (not currently)    OBJECTIVE EXAMINATION   Restrictions:  Restrictions/Precautions: Weight Bearing     Lower Extremity Weight Bearing Restrictions  Left Lower Extremity Weight Bearing: Weight Bearing As Tolerated    Review of Systems:  Vision: Within Functional Limits  Hearing: Within functional limits  Overall Orientation Status: Within Normal Limits  Patient affect[de-identified] Normal  Follows Commands: Within Functional Limits    VBI Screening / Lumbar Screening:        Regional Screen:         Observations:   Incision healing, water blister noted on proximal tibia    Palpation:        Ambulation/Gait (if applicable):       Balance Screen:       Neuro Screen:    Left AROM  Right AROM       Knee FLEX AAROM to 95*; EXT -5*             Left PROM  Right PROM                    Left Strength  Right Strength          I SLR - knee EXT 3+/5          Cervical Assessment             Thoracic Assessment            Lumbar Assessment           Trunk Strength           Muscle Length/Flexibility:      Joint Mobility (if applicable):        Special Tests:        Balance/Gait Assessment(s) Performed:  Ambulating with RW    Additional Finding(s) (if applicable):           ASSESSMENT     Impression: Assessment: TUG without walker 18 sec    Body Structures, Functions, Activity Limitations Requiring Skilled Therapeutic Intervention: Decreased functional mobility , Decreased ROM, Decreased strength    Statement of Medical Necessity: Physical Therapy is both indicated and medically necessary as outlined in the POC to increase the likelihood of meeting the functionally related goals stated below. Patient's Activity Tolerance:        Patient's rehabilitation potential/prognosis is considered to be: Good    Factors which may impact rehabilitation potential include: None        GOALS   Patient Goal(s): walk without support  Short Term Goals Completed by   Goal Status                                                                   Long Term Goals Completed by 6 weeks plan expires 12/24/22 Goal Status   Pt will demo I with HEP/symptom managmenet. Pt will demo @least 115 deg knee flex to ease transfers.      patient will walk 750 ft using cane in 6 MWT           patient will complete TUG in 11 sec without using AD                                        TREATMENT PLAN            Pt. actively involved in establishing Plan of Care and Goals: Yes  Patient/ Caregiver education and instruction:               Treatment may include any combination of the following:       Frequency / Duration: Patient to be seen   for   weeks      Eval Complexity:    Decision Making: Medium Complexity  History: PF- L knee OA  Exam: ROM/ TUG  Clinical Presentation: evolving    PT Treatment Completed: Therapy Time  Individual Time In: 4343       Individual Time Out: 1200  Minutes: 55  Timed Code Treatment Minutes: 25 Minutes     Therapist Signature: Bobby Jacob PT    Date: 50/21/2238     I certify that the above Therapy Services are being furnished while the patient is under my care. I agree with the treatment plan and certify that this therapy is necessary. Physician's Signature:  ___________________________   Date:_______                                                                   Rui Funez MD        Physician Comments: _______________________________________________    Please sign and return to [address] [facility]. Please fax to the location listed below.  [facility] for this referral!    [address]  [address] [address]  Dept: [address]: [phone]       POC NOTE

## 2022-11-11 NOTE — PLAN OF CARE
Outpatient Physical Therapy           [place]           [] Phone: [phone]   Fax: [phone]  [facility]           [x] Phone: [phone]   Fax: [phone]     To: Suzon Gottron, MD Elease Lands   From: Kody Lamar, PT,      Patient: Katelyn Nix       : 1950  Diagnosis: Unilateral primary osteoarthritis, left knee [M17.12]  Presence of left artificial knee joint [Z96.652] Diagnosis: L TKA 10/18/22  Treatment Diagnosis: L knee stiffness  Date: 2022    Physical Therapy Certification/Re-Certification Form    The following patient has been evaluated for physical therapy services and for therapy to continue, insurance requires physician review of the treatment plan initially and every 90 days. Please review the attached evaluation and/or summary of the patient's plan of care, and verify that you agree therapy should continue by signing the attached document and sending it back to our office. Assessment:    Assessment: TUG without walker 18 sec    Plan of Care/Treatment to date:  [x] Therapeutic Exercise  [] Modalities:  [x] Therapeutic Activity     [] Ultrasound  [] Electrical Stimulation  [x] Gait Training      [] Cervical Traction [] Lumbar Traction  [x] Neuromuscular Re-education    [] Cold/hotpack [] Iontophoresis   [x] Instruction in HEP      [x] Vasopneumatic    [] Dry Needling  [x] Manual Therapy               [] Aquatic Therapy       Other:          Frequency/Duration:  # Days per week: [] 1 day # Weeks: [] 1 week [] 5 weeks     [x] 2 days   [] 2 weeks [x] 6 weeks     [] 3 days   [] 3 weeks [] 7 weeks     [] 4 days   [] 4 weeks [] 8 weeks         [] 9 weeks [] 10 weeks         [] 11 weeks [] 12 weeks    Rehab Potential/Progress: [] Excellent [x] Good [] Fair  [] Poor     Goals:    Patient goals: walk without support  Long Term Goals  Time Frame for Long Term Goals: 6 weeks plan expires 22  Pt will demo I with HEP/symptom managmenet.    Pt will demo @least 115 deg knee flex to ease transfers. patient will walk 750 ft using cane in 6 MWT     patient will complete TUG in 11 sec without using AD  Electronically signed by:  Marie Juan PT, , 11/11/2022, 4:55 PM  If you have any questions or concerns, please don't hesitate to call.   Thank you for your referral.      Physician Signature:________________________________Date:_________ TIME: _____  By signing above, therapists plan is approved by physician

## 2022-11-11 NOTE — FLOWSHEET NOTE
board  start                                       [facility]                      Other Therapeutic Activities/Education:        Home Exercise Program:  add SLR to current hep - patient has been performing standing/sitting AROM exercise      Manual Treatments:        Modalities:        Communication with other providers:        Assessment:  (Response towards treatment session) (Pain Rating)  Pt tolerated  treatment without any adverse reactions or complications this date. . Pt would continue to benefit from skilled therapy interventions to address remaining impairments, improve mobility and strength,  and progress toward goal completion and prepare for d/c including finalizing HEP ;  while reducing risk for re-injury or further decline.   Pain complaints after session 0/10       Plan for Next Session:        Time In / Time Out:    see eval             Timed Code/Total Treatment Minutes:  25' TE x1/55' includes eval      Next Progress Note due:        Plan of Care Interventions:  [x] Therapeutic Exercise  [] Modalities:  [x] Therapeutic Activity     [] Ultrasound  [] Estim  [x] Gait Training      [] Cervical Traction [] Lumbar Traction  [x] Neuromuscular Re-education    [] Cold/hotpack [] Iontophoresis   [x] Instruction in HEP      [x] Vasopneumatic   [] Dry Needling    [x] Manual Therapy               [] Aquatic Therapy              Electronically signed by:  Bobby Rodgers, PT, 11/11/2022, 4:56 PM

## 2022-11-16 ENCOUNTER — HOSPITAL ENCOUNTER (OUTPATIENT)
Dept: PHYSICAL THERAPY | Age: 72
Setting detail: THERAPIES SERIES
Discharge: HOME | End: 2022-11-16
Payer: MEDICARE

## 2022-11-23 ENCOUNTER — HOSPITAL ENCOUNTER (OUTPATIENT)
Dept: PHYSICAL THERAPY | Age: 72
Setting detail: THERAPIES SERIES
Discharge: HOME | End: 2022-11-23
Payer: MEDICARE

## 2022-11-23 PROCEDURE — 97110 THERAPEUTIC EXERCISES: CPT

## 2022-11-23 NOTE — FLOWSHEET NOTE
Outpatient Physical Therapy  [place]           [] Phone: [phone]   Fax: [phone]  [facility] montanez           [x] Phone: [phone]   Fax: [phone]        Physical Therapy Daily Treatment Note  Date:  2022    Patient Name:  Rashida Glaser    :  1950  MRN: [id number]   MRN: [id number]  Restrictions/Precautions: Restrictions/Precautions: Weight Bearing     Lower Extremity Weight Bearing Restrictions  Left Lower Extremity Weight Bearing: Weight Bearing As Tolerated  Diagnosis:   Unilateral primary osteoarthritis, left knee [M17.12]  Presence of left artificial knee joint [Z96.652] Diagnosis: L TKA 10/18/22  Date of Injury/Surgery:   Treatment Diagnosis:  L knee stiffness  Insurance/Certification information: Medicare Advantage no pre cer/ [facility]  Referring Physician: MD Neeta Coley   PCP: Sohail Dupree MD  Next Doctor Visit:    Plan of care signed (Y/N):    Outcome Measure: TUG without walker 18 sec  Visit# / total visits:   1/10 then PN    Pain level:      3-410     end of session 2-3/10  Goals:     Patient goals: walk without support  Long Term Goals  Time Frame for Long Term Goals: 6 weeks plan expires 22  Pt will demo I with HEP/symptom management. Pt will demo @least 115 deg knee flex to ease transfers. patient will walk 750 ft using cane in 6 MWT  patient will complete TUG in 11 sec without using AD            Summary of Evaluation:  Assessment: TUG without walker 18 sec        Subjective:  knee blisters healed, blot spot left. Not sleeping well but not related to pain. Thinking I switched to the cane to quickly. States she got a blister on her heel from sliding on the board last time. Has not been here due to blood clot, was to hold therapy x 1 week. Any changes in Ambulatory Summary Sheet? None        Objective:  heel slide with strap supine AAROM knee flex = 99 deg.    Knee ext  -7 degon prop with quad set   COVID screening questions were asked and patient attested that there had been no contact or symptoms        Exercises: (No more than 4 columns)   Exercise/Equipment Date 11/11/22 Date  11-23-22 Date           WARM UP         nustep Seat 8/arms 9 load 3 x10' Seat 8/arms 9 load 3 x10'          TABLE      Strap FLEX 10 ct x10 reps X 10 AROM without strap + 10 with strap    SAQ  Small roll 5 ct x15 reps Small roll 5 ct x10 reps    SLR  2 x5 reps X10 with 5 sec hold    Heel prop 3 small roll x3' X 3 mins + 10 with quad set and 5 sec hold             STANDING      [employer]   start    Incline board  start                                       [address] [facility]                      Other Therapeutic Activities/Education:  significant time spent reviewing HEP for better compliance with hand outs prepared. Home Exercise Program:  Access Code: [email or web address]  URL: [email or web address]  Date: 11/23/2022  Prepared by: Lulu Fruit    Exercises  Supine Short Arc Quad - 1 x daily - 7 x weekly - 3 sets - 10 reps - 5 hold  Long Sitting Quad Set with Towel Roll Under Heel - 1 x daily - 7 x weekly - 3 sets - 10 reps - 5 hold  Supine Heel Slide with Strap - 1 x daily - 7 x weekly - 3 sets - 10 reps - 5 hold  Seated Long Arc Quad - 1 x daily - 7 x weekly - 3 sets - 10 reps - 5 hold  Seated Knee Flexion Slide - 1 x daily - 7 x weekly - 3 sets - 10 reps - 5 hold  Supine Active Straight Leg Raise - 1 x daily - 7 x weekly - 3 sets - 10 reps - 5 hold      Manual Treatments:  x      Modalities:  x      Communication with other providers:  x      Assessment:  (Response towards treatment session)  pt states she will do better with HEP with hand outs  (Pain Rating) states knee actually feels better after session. Pt tolerated  treatment without any adverse reactions or complications this date.  . Pt would continue to benefit from skilled therapy interventions to address remaining impairments, improve mobility and strength,  and progress toward goal completion and prepare for d/c including finalizing HEP ;  while reducing risk for re-injury or further decline.   Pain complaints after session 0/10       Plan for Next Session: review HEP       Time In / Time Out:   [id number]        Timed Code/Total Treatment Minutes:  4/53= 4 te    Next Progress Note due:  x      Plan of Care Interventions:  [x] Therapeutic Exercise  [] Modalities:  [x] Therapeutic Activity     [] Ultrasound  [] Estim  [x] Gait Training      [] Cervical Traction [] Lumbar Traction  [x] Neuromuscular Re-education    [] Cold/hotpack [] Iontophoresis   [x] Instruction in HEP      [x] Vasopneumatic   [] Dry Needling    [x] Manual Therapy               [] Aquatic Therapy              Electronically signed by:  Lilliana Hoover PTA, [id number], 12:39 PM

## 2022-11-25 ENCOUNTER — HOSPITAL ENCOUNTER (OUTPATIENT)
Dept: PHYSICAL THERAPY | Age: 72
Setting detail: THERAPIES SERIES
Discharge: HOME | End: 2022-11-25
Payer: MEDICARE

## 2022-11-25 PROCEDURE — 97110 THERAPEUTIC EXERCISES: CPT

## 2022-11-25 NOTE — FLOWSHEET NOTE
July 2, 2021       Slava Gomez, ZARINA  [address] [facility] [address]  Via Fax: [phone]      Patient: Jeff Herrera   YOB: 1999   Date of Visit: 7/2/2021       Dear Dr. Gomez:    I saw your patient, Ranjit Herrrea, for an evaluation. Below are my notes for this visit with him.    If you have questions, please do not hesitate to call me.      Sincerely,        Alejandra Meadows PA-C        CC: No Recipients  Alejandra Meadows PA-C  7/2/2021 11:49 AM  Sign when Signing Visit  FEMALE PELVIC MEDICINE  AND RECONSTRUCTIVE SURGERY  MD Corby Pendleton MD Abraham Shashoua, MD Alissa Schiller, PA-C      CHIEF COMPLAINT  Chief Complaint   Patient presents with   • Follow-up     bladder pain x 2 days         HISTORY OF PRESENT ILLNESS  I had the pleasure of seeing Mr. Herrera for follow up. He is a 21 year old transmale on exogenous testosterone with history of Interstitial Cystitis, recurrent UTI and recurrent vaginitis. He underwent bilateral subcutaneous mastectomy and nipple reconstruction on 4/20/2021. His post-op course was complicated by urinary retention that resolved after a few days.    Today he presents for evaluation of bladder pain, vulvar irritation, and vaginal odor. His bladder pain improved mostly after a few days of nitrofurantoin, taken for positive e.coli urine culture on 6/18/2021. He had a flare of pain at the end of his shift yesterday and took pyridium. Does not recall if he was holding his urine or voided mid-shift. Denies urgency.  Declines bladder instillation today due to a history of urethral pain after the procedure.    He was hesitant to start D-Mannose due to concern for side effect of diarrhea.    Ongoing vulvar itching is currently mild and managed with prn clotrimazole/betamethasone. Also applied coconut oild as needed. Denies vaginal discharge.     He continues to use E-string for management of vaginal atrophy due to estrogen  Outpatient Physical Therapy  [place]           [] Phone: [phone]   Fax: [phone]  [place]           [x] Phone: [phone]   Fax: [phone]        Physical Therapy Daily Treatment Note  Date:  2022    Patient Name:  Ye Rios    :  1950  MRN: [id number]   MRN: [id number]  Restrictions/Precautions: Restrictions/Precautions: Weight Bearing     Lower Extremity Weight Bearing Restrictions  Left Lower Extremity Weight Bearing: Weight Bearing As Tolerated  Diagnosis:   Unilateral primary osteoarthritis, left knee [M17.12]  Presence of left artificial knee joint [Z96.652] Diagnosis: L TKA 10/18/22  Date of Injury/Surgery:   Treatment Diagnosis:  L knee stiffness  Insurance/Certification information: Medicare Advantage no pre cer/ [employer]  Referring Physician: MD Betty Fishman   PCP: Gaurav Zimmer MD  Next Doctor Visit:    Plan of care signed (Y/N):    Outcome Measure: TUG without walker 18 sec  Visit# / total visits:   3/10 then PN  Pain level:   2-3/10        Goals:     Patient goals: walk without support  Long Term Goals  Time Frame for Long Term Goals: 6 weeks plan expires 22  Pt will demo I with HEP/symptom management. Pt will demo @least 115 deg knee flex to ease transfers. patient will walk 750 ft using cane in 6 MWT  patient will complete TUG in 11 sec without using AD            Summary of Evaluation:  Assessment: TUG without walker 18 sec        Subjective:    patient reports of 2-3/10 pain upon arrival and appears amb with a SPC    she reports she slept 5-6 hours last night straight through      Any changes in Ambulatory Summary Sheet? None        Objective:  heel slide with strap supine AAROM knee flex = 105 deg.    Knee ext  -7 deg on prop with quad set       COVID screening questions were asked and patient attested that there had been no contact or symptoms        Exercises: (No more than 4 columns)   Exercise/Equipment Date 22 Date  11-23-22 Date 11/25/22           WARM UP         nustep Seat 8/arms 9 load 3 x10' Seat 8/arms 9 load 3 x10' Seat 8/arms 9 load 3 x12'         TABLE      Strap FLEX 10 ct x10 reps X 10 AROM without strap + 10 with strap 10x10\" w/strap   SAQ  Small roll 5 ct x15 reps Small roll 5 ct x10 reps Small roll 15x5\"   SLR  2 x5 reps X10 with 5 sec hold 15x5\"    Heel prop 3 small roll x3' X 3 mins + 10 with quad set and 5 sec hold 4\" w/QS            STANDING      Marches   start 3 laps   Incline board  start 2x1'                                      PROPRIOCEPTION                                    MODALITIES                      Other Therapeutic Activities/Education:  significant time spent reviewing HEP for better compliance with hand outs prepared. Home Exercise Program:  Access Code: [email or web address]  URL: [email or web address]  Date: 11/23/2022  Prepared by: Mini Diez    Exercises  Supine Short Arc Quad - 1 x daily - 7 x weekly - 3 sets - 10 reps - 5 hold  Long Sitting Quad Set with Towel Roll Under Heel - 1 x daily - 7 x weekly - 3 sets - 10 reps - 5 hold  Supine Heel Slide with Strap - 1 x daily - 7 x weekly - 3 sets - 10 reps - 5 hold  Seated Long Arc Quad - 1 x daily - 7 x weekly - 3 sets - 10 reps - 5 hold  Seated Knee Flexion Slide - 1 x daily - 7 x weekly - 3 sets - 10 reps - 5 hold  Supine Active Straight Leg Raise - 1 x daily - 7 x weekly - 3 sets - 10 reps - 5 hold      Manual Treatments:  x      Modalities:  x      Communication with other providers:  x      Assessment:  (Response towards treatment session)     (Pain Rating)  Pt tolerated  treatment without any adverse reactions or complications this date. . Pt would continue to benefit from skilled therapy interventions to address remaining impairments, improve mobility and strength,  and progress toward goal completion and prepare for d/c including finalizing HEP ;  while reducing risk for re-injury or further decline.   Pain complaints after deficiency with good symptom control. Previously tried vaginal estrogen cream with poor tolerance due to pain with insertion related to severe vaginal atrophy and irritation of the cream. Recently heard from insurance that they may not allow coverage for E-string in the future.     He continues to follow with [facility] for management of testosterone therapy. He has been advised for screening pap smear. Recalls completing Gardasil vaccine series in 2013. He reports recent STD testing at [facility] and ED.    He is considering undergoing hysterectomy but does not have any timeline in mind.     Previously took Prozac for anxiety and OCD. Last spoke with Psychiatrist 3 months ago and was advised to not resume Prozac due to medication interactions. He recently established care with a new Psychologist and notes some benefit.         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   • Interstitial cystitis        Past Surgical History:   Procedure Laterality Date   • Irrigation of bladder     • Mastectomy Bilateral 04/20/2021   • Tonsillectomy and adenoidectomy  2006   • [place] tooth extraction         Social History     Tobacco Use   • Smoking status: Never Smoker   • Smokeless tobacco: Never Used   Substance Use Topics   • Alcohol use: Not Currently   • Drug use: Yes     Types: Marijuana     Comment: Hold 24 hours before procedure       Family History   Problem Relation Age of Onset   • Hyperthyroid Mother    • Hypertension Father    • Diabetes Paternal Grandmother    • Myocardial Infarction Paternal Grandmother        MEDICATIONS  Outpatient Medications Marked as Taking for the 7/2/21 encounter (Office Visit) with Alejandra Meadows PA-C   Medication Sig Dispense Refill   • Meth-Hyo-M Bl-Na Phos-Ph Sal (USTELL PO)      • phenazopyridine (Pyridium) 200 MG tablet Take 1 tablet by mouth 3 times daily as needed (urinary pain). 30 tablet 3   •  estradiol (Estring) 2 MG vaginal ring Place 1 each vaginally every 3 months. follow package directions 1 each 3   • Alcohol Swabs (B-D SINGLE USE SWABS REGULAR) Pads USE UTD     • Syringe/Needle, Disp, (B-D LUER-DAVID SYRINGE) 20G X 1\" 3 ML Misc      • B-D LUER-DAVID SYRINGE 20G X 1\" 3 ML Misc      • Syringe/Needle, Disp, (B-D LUER-DAVID SYRINGE) 23G X 1\" 3 ML Misc      • B-D LUER-DAVID SYRINGE 23G X 1\" 3 ML Misc      • testosterone cypionate (DEPO-TESTOSTERONE) 200 MG/ML injectable solution INJECT 0.25 ML IM Q WEEK     • tretinoin (RETIN-A) 0.025 % cream Apply topically nightly. Indications: Common Acne       Medications were reviewed and updated today.    REVIEW OF SYSTEMS  Review of Systems   Constitutional: Negative for chills and fever.   HENT: Negative for congestion, sore throat and tinnitus.    Eyes: Negative for blurred vision and pain.   Respiratory: Negative for cough, shortness of breath and wheezing.    Cardiovascular: Negative for chest pain and palpitations.   Gastrointestinal: Negative for abdominal pain, constipation, diarrhea, nausea and vomiting.   Genitourinary:        As per HPI   Musculoskeletal: Negative for myalgias.   Skin: Negative for itching and rash.   Neurological: Negative for dizziness, weakness and headaches.   Endo/Heme/Allergies: Does not bruise/bleed easily.   Psychiatric/Behavioral: Negative for depression. The patient is not nervous/anxious and does not have insomnia.        PHYSICAL EXAM  Visit Vitals  /74 (BP Location: LUE - Left upper extremity, Patient Position: Sitting, Cuff Size: Regular)   Pulse 69   Temp 97.3 °F (36.3 °C) (Tympanic)   Resp 18   Ht 5' 6\" (1.676 m)   Wt 67.1 kg (148 lb)   BMI 23.89 kg/m²         Constitutional:   General Appearance: in no acute distress and current vital signs reviewed.   Head and Face:   Examination of the Head and Face: a traumatic, no deformities, norm cephalic and normal facies.  Eyes:  Inspection of Conjunctiva and Lids: no discharge,  session 1/10       Plan for Next Session: review HEP       Time In / Time Out:   7835-4356        Timed Code/Total Treatment Minutes:   60te    Next Progress Note due:  x      Plan of Care Interventions:  [x] Therapeutic Exercise  [] Modalities:  [x] Therapeutic Activity     [] Ultrasound  [] Estim  [x] Gait Training      [] Cervical Traction [] Lumbar Traction  [x] Neuromuscular Re-education    [] Cold/hotpack [] Iontophoresis   [x] Instruction in HEP      [x] Vasopneumatic   [] Dry Needling    [x] Manual Therapy               [] Aquatic Therapy              Electronically signed by:  Felix Degroot PTA 11/25/2022, 10:34 AM no eyelid swelling and no ptosis.  ENT:  External Inspection of Ears & Nose: normal appearing outer ear and normal appearing nose.   Neck:  Examination of Neck: normal appearing neck and no mass was seen.  Lymphatic:  Palpation of Lymph Nodes: no lymphadenopathy.  Chest:  Examination of  Chest :  normal chest appearance.   Pulmonary:  Assessment of Respiratory Effort: no respiratory distress, normal respiratory rate and effort and no accessory muscle use.   Abdomen:  Examination of Abdomen: soft, nontender and nondistended.  Gastrointestinal:  Examination of Anus: normal anus.  External Genitalia:  Examination of External Genitalia: the external genitalia showed no abnormalities and no lesions.  Vagina:  Examination of Vagina:  Unable to exam due to pain related to patient's severe vaginal atrophy. Wet prep performed from blind vaginal swab. Wet Prep: vaginal atrophy.  Musculoskeletal: ·  Gait and Station: normal gait.  Neurological: oriented to person, oriented to place and oriented to time.   Skin: Normal, no lesions, rashes      RESULTS  Urine dip: positive nitrites    ASSESSMENT/PLAN  1. Bladder pain    2. Estrogen deficiency    3. Vaginal odor        Mr. Herrera will continue pyridium prn. He declines bladder instillation today due to urethral pain after the procedure, but will consider having it at another time. Urine will be sent for culture.     I advised starting D-Mannose 1 g daily.    I recommended continuing clotrimazole application to vulva and adding betamethasone cream twice daily. He should also continue applying coconut oil and may also consider vaginal moisturizer.     I addressed his increased symptoms of anxiety and OCD. I advised close follow up with Psychology and to inquire if alterative medication may be proposed for management of anxiety and OCD.    The patient endorsed considerable anxiety regarding undergoing Pap smear in the setting of marked vaginal pain due to his vaginal atrophy.  We discussed the indication for Pap smears in identifying HPV, cervical dysplasia and malignancy. The patient will consider his options and will schedule annual gynecologic exam with our office or [facility], whichever he is most comfortable. We also discussed the preference for cervical pap smear before undergoing hysterectomy. He will consider scheduling with one of our Urogynecologists if he wants to move forward with surgery.       Total face-to-face time spent with the patient was 20 minutes with greater than 50% of the total time spent on counseling and/or coordinating care

## 2022-11-29 ENCOUNTER — HOSPITAL ENCOUNTER (OUTPATIENT)
Dept: PHYSICAL THERAPY | Age: 72
Setting detail: THERAPIES SERIES
Discharge: HOME | End: 2022-11-29
Payer: MEDICARE

## 2022-11-29 PROCEDURE — 97110 THERAPEUTIC EXERCISES: CPT

## 2022-11-29 NOTE — FLOWSHEET NOTE
Outpatient Physical Therapy  [place]           [] Phone: [phone]   Fax: [phone]  [facility]           [x] Phone: [phone]   Fax: [phone]        Physical Therapy Daily Treatment Note  Date:  2022    Patient Name:  Angelica Diaz    :  1950  MRN: [id number]   MRN: [id number]  Restrictions/Precautions: Restrictions/Precautions: Weight Bearing     Lower Extremity Weight Bearing Restrictions  Left Lower Extremity Weight Bearing: Weight Bearing As Tolerated  Diagnosis:   Unilateral primary osteoarthritis, left knee [M17.12]  Presence of left artificial knee joint [Z96.652] Diagnosis: L TKA 10/18/22  Date of Injury/Surgery:   Treatment Diagnosis:  L knee stiffness  Insurance/Certification information: Medicare Advantage no pre cer/ [facility]  Referring Physician: MD Kenan Cazares   PCP: Elysia Brewster MD  Next Doctor Visit:    Plan of care signed (Y/N):    Outcome Measure: TUG without walker 18 sec  Visit# / total visits:   310 then PN  Pain level:   2-3/10        Goals:     Patient goals: walk without support  Long Term Goals  Time Frame for Long Term Goals: 6 weeks plan expires 22  Pt will demo I with HEP/symptom management. Pt will demo @least 115 deg knee flex to ease transfers. patient will walk 750 ft using cane in 6 MWT  patient will complete TUG in 11 sec without using AD            Summary of Evaluation:  Assessment: TUG without walker 18 sec        Subjective:    Patient rates her knee pain 2/10 today. Still having trouble with the blister on her left heel. Any changes in Ambulatory Summary Sheet? None        Objective:  heel slide with strap supine AAROM knee flex = 104 deg.    Knee ext  -7 AROM       COVID screening questions were asked and patient attested that there had been no contact or symptoms        Exercises: (No more than 4 columns)   Exercise/Equipment Date 22 Date  22 Date 22            WARM UP nustep Seat 8/arms 9 load 3 x10' Seat 8/arms 9 load 3 x10' Seat 8/arms 9 load 3 x12' Seat 8/arms 9 load 3 x13'          TABLE       Strap FLEX 10 ct x10 reps X 10 AROM without strap + 10 with strap 10x10\" w/strap 10x10\" with strap   SAQ  Small roll 5 ct x15 reps Small roll 5 ct x10 reps Small roll 15x5\" Small Roll 15x5\"   SLR  2 x5 reps X10 with 5 sec hold 15x5\"  15x5\"   Heel prop 3 small roll x3' X 3 mins + 10 with quad set and 5 sec hold 4\" w/QS 4' with QS             STANDING       Marches   start 3 laps 3 laps   Incline board  start 2x1' 2x1 min                                            PROPRIOCEPTION                                          MODALITIES                         Other Therapeutic Activities/Education:  significant time spent reviewing HEP for better compliance with hand outs prepared. Home Exercise Program:  Access Code: [email or web address]  URL: [email or web address]/  Date: 11/23/2022  Prepared by: Nikole Roque    Exercises  Supine Short Arc Quad - 1 x daily - 7 x weekly - 3 sets - 10 reps - 5 hold  Long Sitting Quad Set with Towel Roll Under Heel - 1 x daily - 7 x weekly - 3 sets - 10 reps - 5 hold  Supine Heel Slide with Strap - 1 x daily - 7 x weekly - 3 sets - 10 reps - 5 hold  Seated Long Arc Quad - 1 x daily - 7 x weekly - 3 sets - 10 reps - 5 hold  Seated Knee Flexion Slide - 1 x daily - 7 x weekly - 3 sets - 10 reps - 5 hold  Supine Active Straight Leg Raise - 1 x daily - 7 x weekly - 3 sets - 10 reps - 5 hold      Manual Treatments:  x      Modalities:  x      Communication with other providers:  x      Assessment:  (Response towards treatment session)   No pain rating obtained at the end of treatment. Continue to progress strength and ROM as tolerated. (Pain Rating)  Pt tolerated  treatment without any adverse reactions or complications this date.  . Pt would continue to benefit from skilled therapy interventions to address remaining impairments, improve mobility and strength,  and progress toward goal completion and prepare for d/c including finalizing HEP ;  while reducing risk for re-injury or further decline.   Pain complaints after session 1/10       Plan for Next Session: review HEP       Time In / Time Out:   1115/1200        Timed Code/Total Treatment Minutes:   45 te    Next Progress Note due:  x      Plan of Care Interventions:  [x] Therapeutic Exercise  [] Modalities:  [x] Therapeutic Activity     [] Ultrasound  [] Estim  [x] Gait Training      [] Cervical Traction [] Lumbar Traction  [x] Neuromuscular Re-education    [] Cold/hotpack [] Iontophoresis   [x] Instruction in HEP      [x] Vasopneumatic   [] Dry Needling    [x] Manual Therapy               [] Aquatic Therapy              Electronically signed by:  Stefani King PTA 11/29/2022, 11:35 AM

## 2022-12-02 ENCOUNTER — HOSPITAL ENCOUNTER (OUTPATIENT)
Dept: PHYSICAL THERAPY | Age: 72
Setting detail: THERAPIES SERIES
Discharge: HOME | End: 2022-12-02
Payer: MEDICARE

## 2022-12-02 PROCEDURE — 97110 THERAPEUTIC EXERCISES: CPT

## 2022-12-02 NOTE — FLOWSHEET NOTE
Outpatient Physical Therapy  [place]           [] Phone: [phone]   Fax: [phone]  [facility]           [x] Phone: [phone]   Fax: [phone]        Physical Therapy Daily Treatment Note  Date:  2022    Patient Name:  Allison Gallardo    :  1950  MRN: [id number]   MRN: [id number]  Restrictions/Precautions: Restrictions/Precautions: Weight Bearing     Lower Extremity Weight Bearing Restrictions  Left Lower Extremity Weight Bearing: Weight Bearing As Tolerated  Diagnosis:   Unilateral primary osteoarthritis, left knee [M17.12]  Presence of left artificial knee joint [Z96.652] Diagnosis: L TKA 10/18/22  Date of Injury/Surgery:   Treatment Diagnosis:  L knee stiffness  Insurance/Certification information: Medicare Advantage no pre cer/ [facility]  Referring Physician: MD Felisha Camacho   PCP: Fabio Evangelista MD  Next Doctor Visit:    Plan of care signed (Y/N):    Outcome Measure: TUG without walker 18 sec  Visit# / total visits:   5/10 then PN  Pain level:   2-3/10        Goals:     Patient goals: walk without support  Long Term Goals  Time Frame for Long Term Goals: 6 weeks plan expires 22  Pt will demo I with HEP/symptom management. Pt will demo @least 115 deg knee flex to ease transfers. patient will walk 750 ft using cane in 6 MWT  patient will complete TUG in 11 sec without using AD            Summary of Evaluation:  Assessment: TUG without walker 18 sec        Subjective:    Patient rates her knee pain 2-3/10 today. Still having trouble with the blister on her left heel and is having increased back pain due to sleeping in her recliner      Any changes in Ambulatory Summary Sheet? None        Objective:  heel slide with strap supine AAROM knee flex = 105 deg.    Knee ext  -7 AROM       COVID screening questions were asked and patient attested that there had been no contact or symptoms        Exercises: (No more than 4 columns)   Exercise/Equipment Date 22 Date  11-23-22 Date 11/25/22 11/29/2022 12/2/22  (5)             WARM UP           nustep Seat 8/arms 9 load 3 x10' Seat 8/arms 9 load 3 x10' Seat 8/arms 9 load 3 x12' Seat 8/arms 9 load 3 x13' Seat 8-7-6/arms 9 load 3 x12'           TABLE        Strap FLEX 10 ct x10 reps X 10 AROM without strap + 10 with strap 10x10\" w/strap 10x10\" with strap 10x10\" with strap   SAQ  Small roll 5 ct x15 reps Small roll 5 ct x10 reps Small roll 15x5\" Small Roll 15x5\" Half roll 2x10 5\"   SLR  2 x5 reps X10 with 5 sec hold 15x5\"  15x5\" 2x10 5\"   Heel prop 3 small roll x3' X 3 mins + 10 with quad set and 5 sec hold 4\" w/QS 4' with QS 5'   Quad sets     10x10\"                                      STANDING        Marches   start 3 laps 3 laps 3 laps   Incline board  start 2x1' 2x1 min  2x1'   3-way SLR     10x ea way                                        PROPRIOCEPTION                                                MODALITIES                            Other Therapeutic Activities/Education:  significant time spent reviewing HEP for better compliance with hand outs prepared. Home Exercise Program:  Access Code: [email or web address]  URL: [email or web address]  Date: 11/23/2022  Prepared by: Socorro Necessary    Exercises  Supine Short Arc Quad - 1 x daily - 7 x weekly - 3 sets - 10 reps - 5 hold  Long Sitting Quad Set with Towel Roll Under Heel - 1 x daily - 7 x weekly - 3 sets - 10 reps - 5 hold  Supine Heel Slide with Strap - 1 x daily - 7 x weekly - 3 sets - 10 reps - 5 hold  Seated Long Arc Quad - 1 x daily - 7 x weekly - 3 sets - 10 reps - 5 hold  Seated Knee Flexion Slide - 1 x daily - 7 x weekly - 3 sets - 10 reps - 5 hold  Supine Active Straight Leg Raise - 1 x daily - 7 x weekly - 3 sets - 10 reps - 5 hold      Manual Treatments:  x      Modalities:  x      Communication with other providers:  x      Assessment:  (Response towards treatment session)    3-4/10 pain at end of session   Continue to progress strength and ROM as tolerated. (Pain Rating)  Pt tolerated  treatment without any adverse reactions or complications this date. . Pt would continue to benefit from skilled therapy interventions to address remaining impairments, improve mobility and strength,  and progress toward goal completion and prepare for d/c including finalizing HEP ;  while reducing risk for re-injury or further decline.   Pain complaints after session 3-4/10       Plan for Next Session: review HEP       Time In / Time Out:   0395-9124        Timed Code/Total Treatment Minutes:   59te     Next Progress Note due:  x      Plan of Care Interventions:  [x] Therapeutic Exercise  [] Modalities:  [x] Therapeutic Activity     [] Ultrasound  [] Estim  [x] Gait Training      [] Cervical Traction [] Lumbar Traction  [x] Neuromuscular Re-education    [] Cold/hotpack [] Iontophoresis   [x] Instruction in HEP      [x] Vasopneumatic   [] Dry Needling    [x] Manual Therapy               [] Aquatic Therapy              Electronically signed by:  Jett Ray, ALYSON 12/2/2022, 1:00 PM

## 2022-12-05 ENCOUNTER — HOSPITAL ENCOUNTER (OUTPATIENT)
Dept: PHYSICAL THERAPY | Age: 72
Setting detail: THERAPIES SERIES
Discharge: HOME | End: 2022-12-05
Payer: MEDICARE

## 2022-12-05 PROCEDURE — 97112 NEUROMUSCULAR REEDUCATION: CPT

## 2022-12-05 PROCEDURE — 97110 THERAPEUTIC EXERCISES: CPT

## 2022-12-05 PROCEDURE — 97530 THERAPEUTIC ACTIVITIES: CPT

## 2022-12-05 NOTE — FLOWSHEET NOTE
12/5/22           WARM UP         nustep Seat 8/arms 9 load 3 x13' Seat 8-7-6/arms 9 load 3 x12' Seat 8-7-6/arms 9 load 3 x12'         TABLE      Strap FLEX 10x10\" with strap 10x10\" with strap 10x10\" with strap   SAQ  Small Roll 15x5\" Half roll 2x10 5\" Half roll 2x10 5\"   SLR  15x5\" 2x10 5\" 10x10\"   Heel prop 4' with QS 5' 10x10\"   5'   Quad sets  10x10\" Combined w/prop                               STANDING      Marches  3 laps 3 laps 3 laps   Incline board 2x1 min  2x1' 2x1'    3-way SLR  10x ea way 15x ea way                                PROPRIOCEPTION                                    MODALITIES                      Other Therapeutic Activities/Education:  significant time spent reviewing HEP for better compliance with hand outs prepared. Home Exercise Program:  Access Code: [email or web address]  URL: [email or web address]  Date: 11/23/2022  Prepared by: Karli Conteh    Exercises  Supine Short Arc Quad - 1 x daily - 7 x weekly - 3 sets - 10 reps - 5 hold  Long Sitting Quad Set with Towel Roll Under Heel - 1 x daily - 7 x weekly - 3 sets - 10 reps - 5 hold  Supine Heel Slide with Strap - 1 x daily - 7 x weekly - 3 sets - 10 reps - 5 hold  Seated Long Arc Quad - 1 x daily - 7 x weekly - 3 sets - 10 reps - 5 hold  Seated Knee Flexion Slide - 1 x daily - 7 x weekly - 3 sets - 10 reps - 5 hold  Supine Active Straight Leg Raise - 1 x daily - 7 x weekly - 3 sets - 10 reps - 5 hold      Manual Treatments:  x      Modalities:  x      Communication with other providers:  x      Assessment:  (Response towards treatment session)   2/10 pain at end of session   Continue to progress strength and ROM as tolerated. (Pain Rating)  Pt tolerated  treatment without any adverse reactions or complications this date.  . Pt would continue to benefit from skilled therapy interventions to address remaining impairments, improve mobility and strength,  and progress toward goal completion and prepare for d/c including finalizing HEP ; while reducing risk for re-injury or further decline.   Pain complaints after session 2/10       Plan for Next Session: review HEP       Time In / Time Out:    4164-5470        Timed Code/Total Treatment Minutes:   48 1te 1ta 1nr      Next Progress Note due:  x      Plan of Care Interventions:  [x] Therapeutic Exercise  [] Modalities:  [x] Therapeutic Activity     [] Ultrasound  [] Estim  [x] Gait Training      [] Cervical Traction [] Lumbar Traction  [x] Neuromuscular Re-education    [] Cold/hotpack [] Iontophoresis   [x] Instruction in HEP      [x] Vasopneumatic   [] Dry Needling    [x] Manual Therapy               [] Aquatic Therapy              Electronically signed by:  Jesus Ortiz PTA 12/5/2022, 12:58 PM

## 2022-12-08 ENCOUNTER — HOSPITAL ENCOUNTER (OUTPATIENT)
Dept: PHYSICAL THERAPY | Age: 72
Setting detail: THERAPIES SERIES
Discharge: HOME | End: 2022-12-08
Payer: MEDICARE

## 2022-12-08 PROCEDURE — 97110 THERAPEUTIC EXERCISES: CPT

## 2022-12-08 PROCEDURE — 97530 THERAPEUTIC ACTIVITIES: CPT

## 2022-12-08 PROCEDURE — 97112 NEUROMUSCULAR REEDUCATION: CPT

## 2022-12-08 NOTE — FLOWSHEET NOTE
Outpatient Physical Therapy  [place]           [] Phone: [phone]   Fax: [phone]  [facility]           [x] Phone: [phone]   Fax: [phone]        Physical Therapy Daily Treatment Note  Date:  2022    Patient Name:  Yenifer Iqbal    :  1950  MRN: [id number]   MRN: [id number]  Restrictions/Precautions: Restrictions/Precautions: Weight Bearing     Lower Extremity Weight Bearing Restrictions  Left Lower Extremity Weight Bearing: Weight Bearing As Tolerated  Diagnosis:   Unilateral primary osteoarthritis, left knee [M17.12]  Presence of left artificial knee joint [Z96.652] Diagnosis: L TKA 10/18/22  Date of Injury/Surgery:   Treatment Diagnosis:  L knee stiffness  Insurance/Certification information: Medicare Advantage no pre cer/ [facility]  Referring Physician: MD Hieu Girard   PCP: Willard Eng MD  Next Doctor Visit:    Plan of care signed (Y/N):    Outcome Measure: TUG without walker 18 sec  Visit# / total visits:   7/10 then PN  Pain level:   3-410        Goals:     Patient goals: walk without support  Long Term Goals  Time Frame for Long Term Goals: 6 weeks plan expires 22  Pt will demo I with HEP/symptom management. Pt will demo @least 115 deg knee flex to ease transfers. patient will walk 750 ft using cane in 6 MWT  patient will complete TUG in 11 sec without using AD            Summary of Evaluation:  Assessment: TUG without walker 18 sec        Subjective:    Patient states that [address] shows that blood clots gone. Pain level more today than last session; \"could be the weather\". Any changes in Ambulatory Summary Sheet? None        Objective:    AAROM knee flex = 111 deg.    Knee ext  -10 AROM        COVID screening questions were asked and patient attested that there had been no contact or symptoms        Exercises: (No more than 4 columns)   Exercise/Equipment 2022  (5) 22            WARM UP          nustep Seat 8/arms 9 load 3 x13' Seat 8-7-6/arms 9 load 3 x12' Seat 8-7-6/arms 9 load 3 x12' Seat 8-7-6/arms 9 load 3 x12'          TABLE       Strap FLEX 10x10\" with strap 10x10\" with strap 10x10\" with strap 10x10\" with strap   SAQ  Small Roll 15x5\" Half roll 2x10 5\" Half roll 2x10 5\" Half roll 2x10 5\"   SLR  15x5\" 2x10 5\" 10x10\" 10x w/QS; cues to improve controlled decent   Heel prop 4' with QS 5' 10x10\"   5' 10x10\"   5'   Quad sets  10x10\" Combined w/prop  Combined w/prop                                   STANDING       Marches  3 laps 3 laps 3 laps 3 laps   Incline board 2x1 min  2x1' 2x1'  2x1'    3-way SLR  10x ea way 15x ea way 15x ea way                                    PROPRIOCEPTION                                          MODALITIES                         Other Therapeutic Activities/Education:  significant time spent reviewing HEP for better compliance with hand outs prepared. Home Exercise Program:  Access Code: [email or web address]  URL: [employer]/  Date: 11/23/2022  Prepared by: Ardie Fruit    Exercises  Supine Short Arc Quad - 1 x daily - 7 x weekly - 3 sets - 10 reps - 5 hold  Long Sitting Quad Set with Towel Roll Under Heel - 1 x daily - 7 x weekly - 3 sets - 10 reps - 5 hold  Supine Heel Slide with Strap - 1 x daily - 7 x weekly - 3 sets - 10 reps - 5 hold  Seated Long Arc Quad - 1 x daily - 7 x weekly - 3 sets - 10 reps - 5 hold  Seated Knee Flexion Slide - 1 x daily - 7 x weekly - 3 sets - 10 reps - 5 hold  Supine Active Straight Leg Raise - 1 x daily - 7 x weekly - 3 sets - 10 reps - 5 hold      Manual Treatments:  x      Modalities:  x      Communication with other providers:  x      Assessment:  (Response towards treatment session)  Continue to progress strength and ROM as tolerated; C/o R knee pain today after session. (Pain Rating)  2-3 /10 pain at end of session   Pt tolerated  treatment without any adverse reactions or complications this date.  . Pt would continue to benefit from skilled therapy interventions to address remaining impairments, improve mobility and strength,  and progress toward goal completion and prepare for d/c including finalizing HEP ;  while reducing risk for re-injury or further decline.   Pain complaints after session 2-3/10       Plan for Next Session: review HEP       Time In / Time Out:    6225-4325        Timed Code/Total Treatment Minutes:  55 min ( 2 TE; 1 TA; 1 TE)    Next Progress Note due:  x      Plan of Care Interventions:  [x] Therapeutic Exercise  [] Modalities:  [x] Therapeutic Activity     [] Ultrasound  [] Estim  [x] Gait Training      [] Cervical Traction [] Lumbar Traction  [x] Neuromuscular Re-education    [] Cold/hotpack [] Iontophoresis   [x] Instruction in HEP      [x] Vasopneumatic   [] Dry Needling    [x] Manual Therapy               [] Aquatic Therapy              Electronically signed by:  Peyton Noland PTA,   12/8/2022, 1:05 PM

## 2022-12-13 ENCOUNTER — HOSPITAL ENCOUNTER (OUTPATIENT)
Dept: PHYSICAL THERAPY | Age: 72
Setting detail: THERAPIES SERIES
Discharge: HOME | End: 2022-12-13
Payer: MEDICARE

## 2022-12-13 PROCEDURE — 97530 THERAPEUTIC ACTIVITIES: CPT

## 2022-12-13 PROCEDURE — 97110 THERAPEUTIC EXERCISES: CPT

## 2022-12-13 PROCEDURE — 97112 NEUROMUSCULAR REEDUCATION: CPT

## 2022-12-13 NOTE — FLOWSHEET NOTE
Outpatient Physical Therapy  [place]           [] Phone: [phone]   Fax: [phone]  [facility]           [x] Phone: [phone]   Fax: [phone]        Physical Therapy Daily Treatment Note  Date:  2022    Patient Name:  Heide Atkinson    :  1950  MRN: [id number]   MRN: [id number]  Restrictions/Precautions: Restrictions/Precautions: Weight Bearing     Lower Extremity Weight Bearing Restrictions  Left Lower Extremity Weight Bearing: Weight Bearing As Tolerated  Diagnosis:   Unilateral primary osteoarthritis, left knee [M17.12]  Presence of left artificial knee joint [Z96.652] Diagnosis: L TKA 10/18/22  Date of Injury/Surgery:   Treatment Diagnosis:  L knee stiffness  Insurance/Certification information: Medicare Advantage no pre cer/ [address] [facility]  Referring Physician: Kin Barthel, MD [facility] [place]   PCP: Segun Hester MD  Next Doctor Visit:    Plan of care signed (Y/N):    Outcome Measure: TUG without walker 18 sec  Visit# / total visits: 8/10 then PN  Pain level:   /10        Goals:     Patient goals: walk without support  Long Term Goals  Time Frame for Long Term Goals: 6 weeks plan expires 22  Pt will demo I with HEP/symptom management. Pt will demo @least 115 deg knee flex to ease transfers. patient will walk 750 ft using cane in 6 MWT  patient will complete TUG in 11 sec without using AD            Summary of Evaluation:  Assessment: TUG without walker 18 sec        Subjective:   patient reports sleeping better and less difficulty with dressing   Any changes in Ambulatory Summary Sheet?   None        Objective:   knee EXT strength 3+/5      COVID screening questions were asked and patient attested that there had been no contact or symptoms        Exercises: (No more than 4 columns)   Exercise/Equipment 22  (5) 22            WARM UP          nustep Seat 8-7-6/arms 9 load 3 x12' Seat 8-7-6/arms 9 load 3 x12' Seat 8-7-6/arms 9 load 3 x12' Seat 8-7-6/arms 9 load 3 x12'   Bike     Seat 4 x3'   TABLE       Strap FLEX 10x10\" with strap 10x10\" with strap 10x10\" with strap stop   SAQ  Half roll 2x10 5\" Half roll 2x10 5\" Half roll 2x10 5\" Half roll 3x10 5\"   SLR  2x10 5\" 10x10\" 10x w/QS; cues to improve controlled decent 10x w/QS; cues to improve controlled decent   Heel prop 5' 10x10\"   5' 10x10\"   5' 10x10\"   5'   Quad sets 10x10\" Combined w/prop  Combined w/prop  Combined w/prop                                   STANDING       Marches  3 laps 3 laps 3 laps stop   Incline board 2x1' 2x1'  2x1'  1.5'   3-way SLR 10x ea way 15x ea way 15x ea way stop   hurdles    3 laps   Rocker board    Balance x1.5'                      PROPRIOCEPTION                                          MODALITIES                         Other Therapeutic Activities/Education:  significant time spent reviewing HEP for better compliance with hand outs prepared. Home Exercise Program:  Access Code: [email or web address]  URL: [email or web address]  Date: 11/23/2022  Prepared by: Pradeep Alvarez    Exercises  Supine Short Arc Quad - 1 x daily - 7 x weekly - 3 sets - 10 reps - 5 hold  Long Sitting Quad Set with Towel Roll Under Heel - 1 x daily - 7 x weekly - 3 sets - 10 reps - 5 hold  Supine Heel Slide with Strap - 1 x daily - 7 x weekly - 3 sets - 10 reps - 5 hold  Seated Long Arc Quad - 1 x daily - 7 x weekly - 3 sets - 10 reps - 5 hold  Seated Knee Flexion Slide - 1 x daily - 7 x weekly - 3 sets - 10 reps - 5 hold  Supine Active Straight Leg Raise - 1 x daily - 7 x weekly - 3 sets - 10 reps - 5 hold      Manual Treatments:  x      Modalities:  x      Communication with other providers:  x      Assessment:  (Response towards treatment session)  Continue to progress strength and ROM as tolerated; C/o R knee pain today after session. (Pain Rating)  2-3 /10 pain at end of session   Pt tolerated  treatment without any adverse reactions or complications this date.  . Pt would continue to benefit from skilled therapy interventions to address remaining impairments, improve mobility and strength,  and progress toward goal completion and prepare for d/c including finalizing HEP ;  while reducing risk for re-injury or further decline.   Pain complaints after session 2-3/10       Plan for Next Session: review HEP       Time In / Time Out:   3543-3200        Timed Code/Total Treatment Minutes: 48min ( 1 TE; 1 TA; 1 NRE)    Next Progress Note due:  x      Plan of Care Interventions:  [x] Therapeutic Exercise  [] Modalities:  [x] Therapeutic Activity     [] Ultrasound  [] Estim  [x] Gait Training      [] Cervical Traction [] Lumbar Traction  [x] Neuromuscular Re-education    [] Cold/hotpack [] Iontophoresis   [x] Instruction in HEP      [x] Vasopneumatic   [] Dry Needling    [x] Manual Therapy               [] Aquatic Therapy              Electronically signed by:  Kenny Rice PT,   12/13/2022, 1:13 PM

## 2022-12-15 ENCOUNTER — HOSPITAL ENCOUNTER (OUTPATIENT)
Dept: PHYSICAL THERAPY | Age: 72
Setting detail: THERAPIES SERIES
Discharge: HOME | End: 2022-12-15
Payer: MEDICARE

## 2022-12-15 PROCEDURE — 97112 NEUROMUSCULAR REEDUCATION: CPT

## 2022-12-15 PROCEDURE — 97530 THERAPEUTIC ACTIVITIES: CPT

## 2022-12-15 PROCEDURE — 97110 THERAPEUTIC EXERCISES: CPT

## 2022-12-15 NOTE — FLOWSHEET NOTE
Outpatient Physical Therapy  [place]           [] Phone: [phone]   Fax: [phone]  [facility]           [x] Phone: [phone]   Fax: [phone]        Physical Therapy Daily Treatment Note  Date:  12/15/2022    Patient Name:  Alexia Casillas    :  1950  MRN: [id number]   MRN: [id number]  Restrictions/Precautions: Restrictions/Precautions: Weight Bearing     Lower Extremity Weight Bearing Restrictions  Left Lower Extremity Weight Bearing: Weight Bearing As Tolerated  Diagnosis:   Unilateral primary osteoarthritis, left knee [M17.12]  Presence of left artificial knee joint [Z96.652] Diagnosis: L TKA 10/18/22  Date of Injury/Surgery:   Treatment Diagnosis:  L knee stiffness  Insurance/Certification information: Medicare Advantage no pre aditi/ Corrine Blake  Referring Physician: MD Deepthi Yañez   PCP: Guillermo Baeza MD  Next Doctor Visit:    Plan of care signed (Y/N):    Outcome Measure: TUG without walker 18 sec  Visit# / total visits: 9/10 then PN  Pain level:   2/10        Goals:     Patient goals: walk without support  Long Term Goals  Time Frame for Long Term Goals: 6 weeks plan expires 22  Pt will demo I with HEP/symptom management. Pt will demo @least 115 deg knee flex to ease transfers. patient will walk 750 ft using cane in 6 MWT  patient will complete TUG in 11 sec without using AD            Summary of Evaluation:  Assessment: TUG without walker 18 sec        Subjective:   Patient rates her knee pain 2/10 today. The warm spot on the knee is getting smaller. Most pain is in the outside of the knee. Has slept in bed for the last three nights. Any changes in Ambulatory Summary Sheet?   None        Objective:   Patient walking short distances around the clinic without her cane and forgot it when she went to leave    COVID screening questions were asked and patient attested that there had been no contact or symptoms        Exercises: (No more than 4 columns) Exercise/Equipment 12/8/2022 12/13/22 12/15/2022           WARM UP         nustep Seat 8-7-6/arms 9 load 3 x12' Seat 8-7-6/arms 9 load 3 x12' Seat 8-7-6/arms 9 load 3 x10'   Bike   Seat 4 x3' Seat 4 x 10 min    TABLE      Strap FLEX 10x10\" with strap stop    SAQ  Half roll 2x10 5\" Half roll 3x10 5\" Small Roll  3x10x5\"   SLR  10x w/QS; cues to improve controlled decent 10x w/QS; cues to improve controlled decent 10x w/QS; cues to improve controlled decent   Heel prop 10x10\"   5' 10x10\"   5' 5 min    Quad sets Combined w/prop  Combined w/prop  Combined with prop 10x10\"                              STANDING      Marches  3 laps stop    Incline board 2x1'  1.5' 1.5 min    3-way SLR 15x ea way stop    hurdles  3 laps 3 laps   Rocker board  Balance x1.5' Balance Fwd/Bkwd 1.5 min                     PROPRIOCEPTION                                    MODALITIES                      Other Therapeutic Activities/Education:  significant time spent reviewing HEP for better compliance with hand outs prepared. Home Exercise Program:  Access Code: [email or web address]  URL: [email or web address]  Date: 11/23/2022  Prepared by: Floridalma Manzano    Exercises  Supine Short Arc Quad - 1 x daily - 7 x weekly - 3 sets - 10 reps - 5 hold  Long Sitting Quad Set with Towel Roll Under Heel - 1 x daily - 7 x weekly - 3 sets - 10 reps - 5 hold  Supine Heel Slide with Strap - 1 x daily - 7 x weekly - 3 sets - 10 reps - 5 hold  Seated Long Arc Quad - 1 x daily - 7 x weekly - 3 sets - 10 reps - 5 hold  Seated Knee Flexion Slide - 1 x daily - 7 x weekly - 3 sets - 10 reps - 5 hold  Supine Active Straight Leg Raise - 1 x daily - 7 x weekly - 3 sets - 10 reps - 5 hold      Manual Treatments:  x      Modalities:  x      Communication with other providers:  x      Assessment:  (Response towards treatment session)  No change in pain at the end of treatment.   Will continue to benefit from skilled therapy to progress strength and ROM  (Pain Rating)  2-3 /10 pain at end of session   Pt tolerated  treatment without any adverse reactions or complications this date. . Pt would continue to benefit from skilled therapy interventions to address remaining impairments, improve mobility and strength,  and progress toward goal completion and prepare for d/c including finalizing HEP ;  while reducing risk for re-injury or further decline.   Pain complaints after session 2-3/10       Plan for Next Session: review HEP       Time In / Time Out:   1345/1434        Timed Code/Total Treatment Minutes: 49min ( 1 TE; 1 TA; 1 NRE)    Next Progress Note due:  x      Plan of Care Interventions:  [x] Therapeutic Exercise  [] Modalities:  [x] Therapeutic Activity     [] Ultrasound  [] Estim  [x] Gait Training      [] Cervical Traction [] Lumbar Traction  [x] Neuromuscular Re-education    [] Cold/hotpack [] Iontophoresis   [x] Instruction in HEP      [x] Vasopneumatic   [] Dry Needling    [x] Manual Therapy               [] Aquatic Therapy              Electronically signed by:  Elsa Cordova, ALYSON   12/15/2022, 1:44 PM

## 2022-12-19 ENCOUNTER — HOSPITAL ENCOUNTER (OUTPATIENT)
Dept: PHYSICAL THERAPY | Age: 72
Setting detail: THERAPIES SERIES
Discharge: HOME | End: 2022-12-19
Payer: MEDICARE

## 2022-12-19 PROCEDURE — 97112 NEUROMUSCULAR REEDUCATION: CPT

## 2022-12-19 PROCEDURE — 97110 THERAPEUTIC EXERCISES: CPT

## 2022-12-19 PROCEDURE — 97530 THERAPEUTIC ACTIVITIES: CPT

## 2022-12-19 NOTE — PROGRESS NOTES
Outpatient Physical Therapy           [place]           [] Phone: [phone]   Fax: [phone]  [place]           [x] Phone: [phone]   Fax: [phone]      To:  Celio Whitaker MD     From: Adi Atkins PT,   Patient: Donna Selby                    : 1950  Diagnosis:  Unilateral primary osteoarthritis, left knee [M17.12]  Presence of left artificial knee joint [Z96.652]        Treatment Diagnosis:       Date: 2022  [x]  Progress Note                []  Discharge Note    Evaluation Date:   22  Total Visits to date:   10 Cancels/No-shows to date:      Subjective:   patient reports sleeping has improved but is still difficult      Plan of Care/Treatment to date:  [x] Therapeutic Exercise    [] Modalities:  [x] Therapeutic Activity     [] Ultrasound  [] Electrical Stimulation  [x] Gait Training      [] Cervical Traction   [] Lumbar Traction  [x] Neuromuscular Re-education  [] Cold/hotpack [] Iontophoresis  [x] Instruction in HEP      Other:  [x] Manual Therapy       []  Vasopneumatic  [] Aquatic Therapy       []   Dry Needle Therapy                      Objective/Significant Findings At Last Visit/Comments:   TUG 10.5 sec, 6  ft; 115 knee FLEX      Assessment:         Goal Status:  [] Achieved [x] Partially Achieved  [] Not Achieved   Time Frame for Long Term Goals: 6 weeks plan expires 22  Pt will demo I with HEP/symptom management. -meeting  Pt will demo @least 115 deg knee flex to ease transfers.-meeting  patient will walk 750 ft using cane in 6 MWT- not met  patient will complete TUG in 11 sec without using AD-meeting   updated goals:  patient will complete TUG in 10 sec without using AD        Rehab Potential: [] Excellent [x] Good [] Fair  [] Poor         Patient Status: [] Continue per initial plan of Care     [] Patient now discharged     [x] Additional visits requested, Please re-certify for additional visits:      Requested frequency/duration:  2/week for 4weeks    If we are requesting more visits, we fully anticipate the patient's condition is expected to improve within the treatment timeframe we are requesting. Electronically signed by:  Franck Dahl PT, , 12/19/2022, 6:43 PM    If you have any questions or concerns, please don't hesitate to call.   Thank you for your referral.    Physician Signature:______________________ Date:______ Time: ________  By signing above, therapists plan is approved by physician

## 2022-12-19 NOTE — FLOWSHEET NOTE
Outpatient Physical Therapy  [place]           [] Phone: [phone]   Fax: [phone]  Nydia Magana           [x] Phone: [phone]   Fax: [phone]        Physical Therapy Daily Treatment Note  Date:  2022    Patient Name:  Allison Gallardo    :  1950  MRN: [id number]   MRN: [id number]  Restrictions/Precautions: Restrictions/Precautions: Weight Bearing     Lower Extremity Weight Bearing Restrictions  Left Lower Extremity Weight Bearing: Weight Bearing As Tolerated  Diagnosis:   Unilateral primary osteoarthritis, left knee [M17.12]  Presence of left artificial knee joint [Z96.652] Diagnosis: L TKA 10/18/22  Date of Injury/Surgery:   Treatment Diagnosis:  L knee stiffness  Insurance/Certification information: Medicare Advantage no pre cer/ [facility]  Referring Physician: MD Felisha Camacho   PCP: Fabio Evangelista MD  Next Doctor Visit:    Plan of care signed (Y/N):    Outcome Measure: TUG without walker 18 sec  Visit# / total visits: 10/10 then PN  Pain level:   2/10        Goals:     Patient goals: walk without support  Long Term Goals  Time Frame for Long Term Goals: 6 weeks plan expires 22  Pt will demo I with HEP/symptom management. Pt will demo @least 115 deg knee flex to ease transfers. patient will walk 750 ft using cane in 6 MWT  patient will complete TUG in 11 sec without using AD            Summary of Evaluation:  Assessment: TUG without walker 18 sec        Subjective: patient reports sleeping has improved but is still difficult  Any changes in Ambulatory Summary Sheet?   None        Objective:   TUG 10.5 sec, 6  ft; 115 knee FLEX  COVID screening questions were asked and patient attested that there had been no contact or symptoms        Exercises: (No more than 4 columns)   Exercise/Equipment 12/13/22 12/15/2022 12/19/22           WARM UP         nustep Seat 8-7-6/arms 9 load 3 x12' Seat 8-7-6/arms 9 load 3 x10' Seat 6/arms 9 load 3 x10'   Bike  Seat 4 x3' Seat 4 x 10 min  Seat 4 x 10 min   TABLE      SAQ  Half roll 3x10 5\" Small Roll  3x10x5\" Small Roll  5 ct    2 x 15 reps   SLR  10x w/QS; cues to improve controlled decent 10x w/QS; cues to improve controlled decent 10x w/QS; cues to improve controlled decent   Heel prop 10x10\"   5' 5 min  10'   Quad sets Combined w/prop  Combined with prop 10x10\" Combined w/prop    LAQ   1 x10                        STANDING      Incline board 1.5' 1.5 min  1.5 min   hurdles 3 laps 3 laps 5 laps   Rocker board Balance x1.5' Balance Fwd/Bkwd 1.5 min  Balance Fwd/Bkwd 1.5 min   stairs   Reciprocal pattern              PROPRIOCEPTION                                    MODALITIES                      Other Therapeutic Activities/Education:  significant time spent reviewing HEP for better compliance with hand outs prepared. Home Exercise Program:  Access Code: [email or web address]  URL: [employer]/  Date: 11/23/2022  Prepared by: Yuni Frias    Exercises  Supine Short Arc Quad - 1 x daily - 7 x weekly - 3 sets - 10 reps - 5 hold  Long Sitting Quad Set with Towel Roll Under Heel - 1 x daily - 7 x weekly - 3 sets - 10 reps - 5 hold  Supine Heel Slide with Strap - 1 x daily - 7 x weekly - 3 sets - 10 reps - 5 hold  Seated Long Arc Quad - 1 x daily - 7 x weekly - 3 sets - 10 reps - 5 hold  Seated Knee Flexion Slide - 1 x daily - 7 x weekly - 3 sets - 10 reps - 5 hold  Supine Active Straight Leg Raise - 1 x daily - 7 x weekly - 3 sets - 10 reps - 5 hold      Manual Treatments:  x      Modalities:  x      Communication with other providers:  x      Assessment:  (Response towards treatment session)  No change in pain at the end of treatment. Pt tolerated  treatment without any adverse reactions or complications this date.  . Pt would continue to benefit from skilled therapy interventions to address remaining impairments, improve mobility and strength,  and progress toward goal completion and prepare for d/c including finalizing HEP ;  while reducing risk for re-injury or further decline.   Pain complaints after session 2/10       Plan for Next Session: review HEP       Time In / Time Out:   1310/1417      Timed Code/Total Treatment Minutes: 67min ( 2 TE; 1 TA; 1 NRE)    Next Progress Note due:  x      Plan of Care Interventions:  [x] Therapeutic Exercise  [] Modalities:  [x] Therapeutic Activity     [] Ultrasound  [] Estim  [x] Gait Training      [] Cervical Traction [] Lumbar Traction  [x] Neuromuscular Re-education    [] Cold/hotpack [] Iontophoresis   [x] Instruction in HEP      [x] Vasopneumatic   [] Dry Needling    [x] Manual Therapy               [] Aquatic Therapy              Electronically signed by:  Franck Dahl PT,    12/19/2022, 1:16 PM

## 2022-12-22 ENCOUNTER — HOSPITAL ENCOUNTER (OUTPATIENT)
Dept: PHYSICAL THERAPY | Age: 72
Setting detail: THERAPIES SERIES
Discharge: HOME | End: 2022-12-22
Payer: MEDICARE

## 2022-12-22 PROCEDURE — 97110 THERAPEUTIC EXERCISES: CPT

## 2022-12-22 PROCEDURE — 97530 THERAPEUTIC ACTIVITIES: CPT

## 2022-12-22 PROCEDURE — 97112 NEUROMUSCULAR REEDUCATION: CPT

## 2022-12-22 NOTE — FLOWSHEET NOTE
Outpatient Physical Therapy  [place]           [] Phone: [phone]   Fax: [phone]  Anamaria montanez           [x] Phone: [phone]   Fax: [phone]        Physical Therapy Daily Treatment Note  Date:  2022    Patient Name:  Crys Purvis    :  1950  MRN: [id number]   MRN: [id number]  Restrictions/Precautions: Restrictions/Precautions: Weight Bearing     Lower Extremity Weight Bearing Restrictions  Left Lower Extremity Weight Bearing: Weight Bearing As Tolerated  Diagnosis:   Unilateral primary osteoarthritis, left knee [M17.12]  Presence of left artificial knee joint [Z96.652] Diagnosis: L TKA 10/18/22  Date of Injury/Surgery:   Treatment Diagnosis:  L knee stiffness  Insurance/Certification information: Medicare Advantage no pre cer/ [employer]  Referring Physician: MD Antony Alexander   PCP: Vitaly Bryant MD  Next Doctor Visit:    Plan of care signed (Y/N):    Outcome Measure: TUG without walker 18 sec  Visit# / total visits:  then PN  Pain level:   1-2/10        Goals:     Patient goals: walk without support  Long Term Goals  Time Frame for Long Term Goals: 6 weeks plan expires 22  Pt will demo I with HEP/symptom management. Pt will demo @least 115 deg knee flex to ease transfers. patient will walk 750 ft using cane in 6 MWT  patient will complete TUG in 11 sec without using AD            Summary of Evaluation:  Assessment: TUG without walker 18 sec        Subjective: Patient states that she did a lot of running around yesterday so her knee has been more sore. Rates her pain 1-2/10 today. Any changes in Ambulatory Summary Sheet?   None        Objective:  Improved eccentric control noted with leg raises    COVID screening questions were asked and patient attested that there had been no contact or symptoms        Exercises: (No more than 4 columns)   Exercise/Equipment 12/13/22 12/15/2022 12/19/22 2022            WARM UP          nustep Seat 8-7-6/arms 9 load 3 x12' Seat 8-7-6/arms 9 load 3 x10' Seat 6/arms 9 load 3 x10' Seat 6/arms 9 load 3 x10'   Bike  Seat 4 x3' Seat 4 x 10 min  Seat 4 x 10 min Seat 4 x 10 min   TABLE       SAQ  Half roll 3x10 5\" Small Roll  3x10x5\" Small Roll  5 ct    2 x 15 reps Small Roll  5 ct    2 x 15 reps   SLR  10x w/QS; cues to improve controlled decent 10x w/QS; cues to improve controlled decent 10x w/QS; cues to improve controlled decent 10x w/QS; cues to improve controlled decent   Heel prop 10x10\"   5' 5 min  10' 10 min    Quad sets Combined w/prop  Combined with prop 10x10\" Combined w/prop  Combined w/prop    LAQ   1 x10 10x                           STANDING       Incline board 1.5' 1.5 min  1.5 min 1.5 min    hurdles 3 laps 3 laps 5 laps 5 laps   Rocker board Balance x1.5' Balance Fwd/Bkwd 1.5 min  Balance Fwd/Bkwd 1.5 min Balance Fwd/Bkwd 1.5 min   stairs   Reciprocal pattern                PROPRIOCEPTION                                          MODALITIES                         Other Therapeutic Activities/Education:  significant time spent reviewing HEP for better compliance with hand outs prepared. Home Exercise Program:  Access Code: [email or web address]  URL: [email or web address]  Date: 11/23/2022  Prepared by: Jerel Garduno    Exercises  Supine Short Arc Quad - 1 x daily - 7 x weekly - 3 sets - 10 reps - 5 hold  Long Sitting Quad Set with Towel Roll Under Heel - 1 x daily - 7 x weekly - 3 sets - 10 reps - 5 hold  Supine Heel Slide with Strap - 1 x daily - 7 x weekly - 3 sets - 10 reps - 5 hold  Seated Long Arc Quad - 1 x daily - 7 x weekly - 3 sets - 10 reps - 5 hold  Seated Knee Flexion Slide - 1 x daily - 7 x weekly - 3 sets - 10 reps - 5 hold  Supine Active Straight Leg Raise - 1 x daily - 7 x weekly - 3 sets - 10 reps - 5 hold      Manual Treatments:  x      Modalities:  x      Communication with other providers:  x      Assessment:  (Response towards treatment session)  Patient continued to rate her knee pain 1-2/10 after treatment.   Minimal antalgia noted with ambulation      Plan for Next Session: review HEP       Time In / Time Out:   1301/1352      Timed Code/Total Treatment Minutes: 51 min ( 1 TE; 1 TA 1 NRE)    Next Progress Note due:  x      Plan of Care Interventions:  [x] Therapeutic Exercise  [] Modalities:  [x] Therapeutic Activity     [] Ultrasound  [] Estim  [x] Gait Training      [] Cervical Traction [] Lumbar Traction  [x] Neuromuscular Re-education    [] Cold/hotpack [] Iontophoresis   [x] Instruction in HEP      [x] Vasopneumatic   [] Dry Needling    [x] Manual Therapy               [] Aquatic Therapy              Electronically signed by:  Carlos Sharma PTA    12/22/2022, 1:00 PM

## 2022-12-28 ENCOUNTER — HOSPITAL ENCOUNTER (OUTPATIENT)
Dept: PHYSICAL THERAPY | Age: 72
Setting detail: THERAPIES SERIES
Discharge: HOME | End: 2022-12-28
Payer: MEDICARE

## 2022-12-28 PROCEDURE — 97110 THERAPEUTIC EXERCISES: CPT

## 2022-12-28 PROCEDURE — 97530 THERAPEUTIC ACTIVITIES: CPT

## 2022-12-28 PROCEDURE — 97112 NEUROMUSCULAR REEDUCATION: CPT

## 2022-12-28 NOTE — FLOWSHEET NOTE
Outpatient Physical Therapy  [place]           [] Phone: [phone]   Fax: [phone]  [place]           [x] Phone: [phone]   Fax: [phone]        Physical Therapy Daily Treatment Note  Date:  2022    Patient Name:  Neeta Langley    :  1950  MRN: [id number]   MRN: [id number]  Restrictions/Precautions: Restrictions/Precautions: Weight Bearing     Lower Extremity Weight Bearing Restrictions  Left Lower Extremity Weight Bearing: Weight Bearing As Tolerated  Diagnosis:   Unilateral primary osteoarthritis, left knee [M17.12]  Presence of left artificial knee joint [Z96.652] Diagnosis: L TKA 10/18/22  Date of Injury/Surgery:   Treatment Diagnosis:  L knee stiffness  Insurance/Certification information: Medicare Advantage no pre cer/ [facility]  Referring Physician: MD Margi Zarate   PCP: Veronica Vicente MD  Next Doctor Visit:    Plan of care signed (Y/N):    Outcome Measure: TUG without walker 18 sec  Visit# / total visits:  then PN  Pain level:    3-4/10        Goals:     Patient goals: walk without support  Long Term Goals  Time Frame for Long Term Goals: Pt will demo I with HEP/symptom management. Pt will demo @least 115 deg knee flex to ease transfers. patient will walk 750 ft using cane in 6 MWT  patient will complete TUG in 11 sec without using AD            Summary of Evaluation:  Assessment: TUG without walker 18 sec        Subjective: Patient states that she did a lot of running around yesterday so her knee has been more sore. Rates her pain 1-2/10 today. Any changes in Ambulatory Summary Sheet?   None        Objective:   -5* Ext    COVID screening questions were asked and patient attested that there had been no contact or symptoms        Exercises: (No more than 4 columns)   Exercise/Equipment 22           WARM UP         nustep Seat 6/arms 9 load 3 x10' Seat 6/arms 9 load 3 x10' S6/A9 Lv3 10'   Bike  Seat 4 x 10 min Seat 4 x 10 min S4 10'   TABLE      SAQ  Small Roll  5 ct    2 x 15 reps Small Roll  5 ct    2 x 15 reps Small roll 2x15 5\"   SLR  10x w/QS; cues to improve controlled decent 10x w/QS; cues to improve controlled decent Initiate w/QS and ankle DF 10x   Heel prop 10' 10 min  10'   Quad sets Combined w/prop  Combined w/prop  Combined w/prop   LAQ 1 x10 10x 10x5\"                        STANDING      Incline board 1.5 min 1.5 min  1. 5'   hurdles 5 laps 5 laps 5 laps   Rocker board Balance Fwd/Bkwd 1.5 min Balance Fwd/Bkwd 1.5 min Balance Fwd/Bkwd 1.5 min   stairs Reciprocal pattern                PROPRIOCEPTION                                    MODALITIES                      Other Therapeutic Activities/Education:  significant time spent reviewing HEP for better compliance with hand outs prepared. Home Exercise Program:    Exercises  Supine Short Arc Quad - 1 x daily - 7 x weekly - 3 sets - 10 reps - 5 hold  Long Sitting Quad Set with Towel Roll Under Heel - 1 x daily - 7 x weekly - 3 sets - 10 reps - 5 hold  Supine Heel Slide with Strap - 1 x daily - 7 x weekly - 3 sets - 10 reps - 5 hold  Seated Long Arc Quad - 1 x daily - 7 x weekly - 3 sets - 10 reps - 5 hold  Seated Knee Flexion Slide - 1 x daily - 7 x weekly - 3 sets - 10 reps - 5 hold  Supine Active Straight Leg Raise - 1 x daily - 7 x weekly - 3 sets - 10 reps - 5 hold      Manual Treatments:  x      Modalities:  x      Communication with other providers:  x      Assessment:  (Response towards treatment session)  Patient rates her knee pain 3/10 after treatment.   Minimal antalgia noted with ambulation      Plan for Next Session: review HEP       Time In / Time Out:    4137-5898      Timed Code/Total Treatment Minutes:  64 1nr 1ta 2te    Next Progress Note due:  x      Plan of Care Interventions:  [x] Therapeutic Exercise  [] Modalities:  [x] Therapeutic Activity     [] Ultrasound  [] Estim  [x] Gait Training      [] Cervical Traction [] Lumbar Traction  [x] Neuromuscular Re-education    [] Cold/hotpack [] Iontophoresis   [x] Instruction in HEP      [x] Vasopneumatic   [] Dry Needling    [x] Manual Therapy               [] Aquatic Therapy              Electronically signed by:  Nighat Connell PTA/Keith Telles PT    12/28/2022, 3:54 PM

## 2022-12-30 ENCOUNTER — HOSPITAL ENCOUNTER (OUTPATIENT)
Dept: PHYSICAL THERAPY | Age: 72
Setting detail: THERAPIES SERIES
Discharge: HOME | End: 2022-12-30
Payer: MEDICARE

## 2022-12-30 PROCEDURE — 97110 THERAPEUTIC EXERCISES: CPT

## 2022-12-30 PROCEDURE — 97112 NEUROMUSCULAR REEDUCATION: CPT

## 2022-12-30 PROCEDURE — 97530 THERAPEUTIC ACTIVITIES: CPT

## 2022-12-30 NOTE — FLOWSHEET NOTE
Outpatient Physical Therapy  [place]           [] Phone: [phone]   Fax: [phone]  Anamaria montanez           [x] Phone: [phone]   Fax: [phone]        Physical Therapy Daily Treatment Note  Date:  2022    Patient Name:  Neeta Langley    :  1950  MRN: [id number]   MRN: [id number]  Restrictions/Precautions: Restrictions/Precautions: Weight Bearing     Lower Extremity Weight Bearing Restrictions  Left Lower Extremity Weight Bearing: Weight Bearing As Tolerated  Diagnosis:   Unilateral primary osteoarthritis, left knee [M17.12]  Presence of left artificial knee joint [Z96.652] Diagnosis: L TKA 10/18/22  Date of Injury/Surgery:   Treatment Diagnosis:  L knee stiffness  Insurance/Certification information: Medicare Advantage no pre cer/ [facility]  Referring Physician: MD Margi Zarate   PCP: Veronica Vicente MD  Next Doctor Visit:    Plan of care signed (Y/N):    Outcome Measure: TUG without walker 18 sec  Visit# / total visits:  then PN  Pain level:    3/10        Goals:     Patient goals: walk without support  Long Term Goals  Time Frame for Long Term Goals: Pt will demo I with HEP/symptom management. Pt will demo @least 115 deg knee flex to ease transfers. patient will walk 750 ft using cane in 6 MWT  patient will complete TUG in 11 sec without using AD            Summary of Evaluation:  Assessment: TUG without walker 18 sec        Subjective: Patient reports of 3/10 left knee pain upon arrival today and appears amb w/SPC, she reports her right knee is bothering her more than her left knee today. Any changes in Ambulatory Summary Sheet?   None        Objective:   -5* Ext    COVID screening questions were asked and patient attested that there had been no contact or symptoms        Exercises: (No more than 4 columns)   Exercise/Equipment 22            WARM UP          nustep Seat 6/arms 9 load 3 x10' Seat 6/arms 9 load 3 x10' S6/A9 Lv3 10' S6/A9 Lv3 10'   Bike  Seat 4 x 10 min Seat 4 x 10 min S4 10' S4 10'   TABLE       SAQ  Small Roll  5 ct    2 x 15 reps Small Roll  5 ct    2 x 15 reps Small roll 2x15 5\" Small roll 2x15 5\"   SLR  10x w/QS; cues to improve controlled decent 10x w/QS; cues to improve controlled decent Initiate w/QS and ankle DF 10x Initiate w/QS and ankle DF 15x   Heel prop 10' 10 min  10' 10'   Quad sets Combined w/prop  Combined w/prop  Combined w/prop Combined w/prop   LAQ 1 x10 10x 10x5\" 10x5\"                           STANDING       Incline board 1.5 min 1.5 min  1.5' 1.5'   hurdles 5 laps 5 laps 5 laps 5 laps   Rocker board Balance Fwd/Bkwd 1.5 min Balance Fwd/Bkwd 1.5 min Balance Fwd/Bkwd 1.5 min Balance Fwd/Bkwd 1.5'   stairs Reciprocal pattern                  PROPRIOCEPTION                                          MODALITIES                         Other Therapeutic Activities/Education:  significant time spent reviewing HEP for better compliance with hand outs prepared. Home Exercise Program:        Manual Treatments:  x      Modalities:  x      Communication with other providers:  x      Assessment:  (Response towards treatment session)  Patient rates her knee pain 3/10 after treatment.   Minimal antalgia noted with ambulation      Plan for Next Session: review HEP       Time In / Time Out:     0366-7437      Timed Code/Total Treatment Minutes:   61  1ta 1nr 2te    Next Progress Note due:  x      Plan of Care Interventions:  [x] Therapeutic Exercise  [] Modalities:  [x] Therapeutic Activity     [] Ultrasound  [] Estim  [x] Gait Training      [] Cervical Traction [] Lumbar Traction  [x] Neuromuscular Re-education    [] Cold/hotpack [] Iontophoresis   [x] Instruction in HEP      [x] Vasopneumatic   [] Dry Needling    [x] Manual Therapy               [] Aquatic Therapy              Electronically signed by:  Alana Lau PTA    12/30/2022, 11:11 AM

## 2023-01-03 ENCOUNTER — HOSPITAL ENCOUNTER (OUTPATIENT)
Dept: PHYSICAL THERAPY | Age: 73
Setting detail: THERAPIES SERIES
Discharge: HOME | End: 2023-01-03
Payer: MEDICARE

## 2023-01-03 PROCEDURE — 97530 THERAPEUTIC ACTIVITIES: CPT

## 2023-01-03 PROCEDURE — 97110 THERAPEUTIC EXERCISES: CPT

## 2023-01-03 PROCEDURE — 97112 NEUROMUSCULAR REEDUCATION: CPT

## 2023-01-03 NOTE — FLOWSHEET NOTE
Outpatient Physical Therapy  [place]           [] Phone: [phone]   Fax: [phone]  [place]           [x] Phone: [phone]   Fax: [phone]        Physical Therapy Daily Treatment Note  Date:  1/3/2023    Patient Name:  August Walters    :  1950  MRN: [id number]   MRN: [id number]  Restrictions/Precautions: Restrictions/Precautions: Weight Bearing     Lower Extremity Weight Bearing Restrictions  Left Lower Extremity Weight Bearing: Weight Bearing As Tolerated  Diagnosis:   Unilateral primary osteoarthritis, left knee [M17.12]  Presence of left artificial knee joint [Z96.652] Diagnosis: L TKA 10/18/22  Date of Injury/Surgery:   Treatment Diagnosis:  L knee stiffness  Insurance/Certification information: Medicare Advantage no pre cer/ [facility]  Referring Physician: MD Marlon Calhoun   PCP: Krishna Killian MD  Next Doctor Visit:    Plan of care signed (Y/N):    Outcome Measure: TUG without walker 18 sec  Visit# / total visits:  then PN  Pain level:   2/10        Goals:     Patient goals: walk without support  Long Term Goals  Time Frame for Long Term Goals: Pt will demo I with HEP/symptom management. Pt will demo @least 115 deg knee flex to ease transfers. patient will walk 750 ft using cane in 6 MWT  patient will complete TUG in 11 sec without using AD            Summary of Evaluation:  Assessment: TUG without walker 18 sec        Subjective: Patient reports of 2/10 left knee pain upon arrival today and appears amb w/SPC. To see her phys on 23      Any changes in Ambulatory Summary Sheet?   None        Objective:   -3* Ext    COVID screening questions were asked and patient attested that there had been no contact or symptoms        Exercises: (No more than 4 columns)   Exercise/Equipment 12/28/22 12/30/22 1/3/23           WARM UP         nustep S6/A9 Lv3 10' S6/A9 Lv3 10' S6/A9 Lv3   Bike  S4 10' S4 10' S3 10'   TABLE      SAQ  Small roll 2x15 5\" Small roll 2x15 5\" Small roll 2x15 5\"   SLR  Initiate w/QS and ankle DF 10x Initiate w/QS and ankle DF 15x Initiate w/QS and ankle DF 3x5 reps   Heel prop 10' 10' 10'   Quad sets Combined w/prop Combined w/prop Combined w/prop   LAQ 10x5\" 10x5\" 15x5\"                        STANDING      Incline board 1.5' 1.5' 1.5'   hurdles 5 laps 5 laps 5 laps   Rocker board Balance Fwd/Bkwd 1.5 min Balance Fwd/Bkwd 1.5' Balance Fwd/Bkwd 1.5'   stairs                 PROPRIOCEPTION                                    MODALITIES                      Other Therapeutic Activities/Education:  significant time spent reviewing HEP for better compliance with hand outs prepared. Home Exercise Program:        Manual Treatments:  x      Modalities:  x      Communication with other providers:  x      Assessment:  (Response towards treatment session)  Patient rates her knee pain 3/10 after treatment.   Minimal antalgia noted with ambulation      Plan for Next Session: review HEP       Time In / Time Out:     3282-3829      Timed Code/Total Treatment Minutes:  60 2te 1nr 1ta    Next Progress Note due:  x      Plan of Care Interventions:  [x] Therapeutic Exercise  [] Modalities:  [x] Therapeutic Activity     [] Ultrasound  [] Estim  [x] Gait Training      [] Cervical Traction [] Lumbar Traction  [x] Neuromuscular Re-education    [] Cold/hotpack [] Iontophoresis   [x] Instruction in HEP      [x] Vasopneumatic   [] Dry Needling    [x] Manual Therapy               [] Aquatic Therapy              Electronically signed by:  Pardeep Jacobson PTA    1/3/2023, 12:54 PM

## 2023-01-05 ENCOUNTER — HOSPITAL ENCOUNTER (OUTPATIENT)
Dept: PHYSICAL THERAPY | Age: 73
Setting detail: THERAPIES SERIES
Discharge: HOME | End: 2023-01-05
Payer: MEDICARE

## 2023-01-05 PROCEDURE — 97112 NEUROMUSCULAR REEDUCATION: CPT

## 2023-01-05 PROCEDURE — 97530 THERAPEUTIC ACTIVITIES: CPT

## 2023-01-05 PROCEDURE — 97110 THERAPEUTIC EXERCISES: CPT

## 2023-01-05 NOTE — FLOWSHEET NOTE
Outpatient Physical Therapy  [place]           [] Phone: [phone]   Fax: [phone]  [place]           [x] Phone: [phone]   Fax: [phone]        Physical Therapy Daily Treatment Note  Date:  2023    Patient Name:  Seth Xiong    :  1950  MRN: [id number]   MRN: [id number]  Restrictions/Precautions: Restrictions/Precautions: Weight Bearing     Lower Extremity Weight Bearing Restrictions  Left Lower Extremity Weight Bearing: Weight Bearing As Tolerated  Diagnosis:   Unilateral primary osteoarthritis, left knee [M17.12]  Presence of left artificial knee joint [Z96.652] Diagnosis: L TKA 10/18/22  Date of Injury/Surgery:   Treatment Diagnosis:  L knee stiffness  Insurance/Certification information: Medicare Advantage no pre cer/ 69 [facility]  Referring Physician: MD Deisy Saucedo   PCP: Iraida Parra MD  Next Doctor Visit:    Plan of care signed (Y/N):    Outcome Measure: TUG without walker 18 sec  Visit# / total visits: 15/18 then PN  Pain level:   2/10        Goals:     Patient goals: walk without support  Long Term Goals  Time Frame for Long Term Goals: Pt will demo I with HEP/symptom management. Pt will demo @least 115 deg knee flex to ease transfers. patient will walk 750 ft using cane in 6 MWT  patient will complete TUG in 11 sec without using AD            Summary of Evaluation:  Assessment: TUG without walker 18 sec        Subjective patient reports not having any difficulty with putting shoes/socks on    Any changes in Ambulatory Summary Sheet?   None        Objective:   -TUG 9.5 sec without cane; 823 ft with cane 6 MWT  COVID screening questions were asked and patient attested that there had been no contact or symptoms        Exercises: (No more than 4 columns)   Exercise/Equipment 12/28/22 12/30/22 1/3/23 1/5/23            WARM UP          nustep S6/A9 Lv3 10' S6/A9 Lv3 10' S6/A9 Lv3 S6/A9 Lv3 x5'   Bike  S4 10' S4 10' S3 10'    TABLE       SAQ  Small roll 2x15 5\" Small roll 2x15 5\" Small roll 2x15 5\" Small roll 2x15 5\"   SLR  Initiate w/QS and ankle DF 10x Initiate w/QS and ankle DF 15x Initiate w/QS and ankle DF 3x5 reps stop   Heel prop 10' 10' 10' 10'   Quad sets Combined w/prop Combined w/prop Combined w/prop Combined w/prop   LAQ 10x5\" 10x5\" 15x5\" 15x5\"                           STANDING       Incline board 1.5' 1.5' 1.5' 1.5   hurdles 5 laps 5 laps 5 laps 5 laps   Rocker board Balance Fwd/Bkwd 1.5 min Balance Fwd/Bkwd 1.5' Balance Fwd/Bkwd 1.5' Lateral 1.5   stairs    recicprocal               PROPRIOCEPTION                                          MODALITIES                         Other Therapeutic Activities/Education:  significant time spent reviewing HEP for better compliance with hand outs prepared. Home Exercise Program:        Manual Treatments:  x      Modalities:  x      Communication with other providers:  x      Assessment:  (Response towards treatment session)  Patient rates her knee pain 3/10 after treatment.   Minimal antalgia noted with ambulation      Plan for Next Session: review HEP       Time In / Time Out:     1259/1357      Timed Code/Total Treatment Minutes: 62 2te 1nr 1ta    Next Progress Note due:  x      Plan of Care Interventions:  [x] Therapeutic Exercise  [] Modalities:  [x] Therapeutic Activity     [] Ultrasound  [] Estim  [x] Gait Training      [] Cervical Traction [] Lumbar Traction  [x] Neuromuscular Re-education    [] Cold/hotpack [] Iontophoresis   [x] Instruction in HEP      [x] Vasopneumatic   [] Dry Needling    [x] Manual Therapy               [] Aquatic Therapy              Electronically signed by:  Miri Gold, PT,   1/5/2023, 12:58 PM

## 2023-03-06 ENCOUNTER — HOSPITAL ENCOUNTER (OUTPATIENT)
Dept: WOMENS IMAGING | Age: 73
Discharge: HOME | End: 2023-03-06
Payer: MEDICARE

## 2023-03-06 DIAGNOSIS — Z12.31 ENCOUNTER FOR SCREENING MAMMOGRAM FOR BREAST CANCER: ICD-10-CM

## 2023-03-06 PROCEDURE — 77063 BREAST TOMOSYNTHESIS BI: CPT

## 2024-04-19 ENCOUNTER — HOSPITAL ENCOUNTER (OUTPATIENT)
Dept: WOMENS IMAGING | Age: 74
Discharge: HOME | End: 2024-04-19
Payer: MEDICARE

## 2024-04-19 VITALS — WEIGHT: 235 LBS | BODY MASS INDEX: 41.64 KG/M2 | HEIGHT: 63 IN

## 2024-04-19 DIAGNOSIS — Z12.31 SCREENING MAMMOGRAM, ENCOUNTER FOR: ICD-10-CM

## 2024-04-19 PROCEDURE — 77063 BREAST TOMOSYNTHESIS BI: CPT

## 2025-05-01 ENCOUNTER — TRANSCRIBE ORDERS (OUTPATIENT)
Dept: ADMINISTRATIVE | Age: 75
End: 2025-05-01

## 2025-05-01 DIAGNOSIS — R92.8 ABNORMAL MAMMOGRAM: Primary | ICD-10-CM

## 2025-06-26 ENCOUNTER — HOSPITAL ENCOUNTER (OUTPATIENT)
Dept: ULTRASOUND IMAGING | Age: 75
Discharge: HOME | End: 2025-06-26
Payer: MEDICARE

## 2025-06-26 ENCOUNTER — HOSPITAL ENCOUNTER (OUTPATIENT)
Dept: MAMMOGRAPHY | Age: 75
Discharge: HOME | End: 2025-06-26
Payer: MEDICARE

## 2025-06-26 VITALS — WEIGHT: 255 LBS | BODY MASS INDEX: 45.18 KG/M2 | HEIGHT: 63 IN

## 2025-06-26 DIAGNOSIS — R92.8 ABNORMAL MAMMOGRAM: ICD-10-CM

## 2025-06-26 PROCEDURE — G0279 TOMOSYNTHESIS, MAMMO: HCPCS

## 2025-06-26 PROCEDURE — 76642 ULTRASOUND BREAST LIMITED: CPT
